# Patient Record
Sex: FEMALE | Race: WHITE | NOT HISPANIC OR LATINO
[De-identification: names, ages, dates, MRNs, and addresses within clinical notes are randomized per-mention and may not be internally consistent; named-entity substitution may affect disease eponyms.]

---

## 2017-10-23 ENCOUNTER — RECORD ABSTRACTING (OUTPATIENT)
Age: 52
End: 2017-10-23

## 2017-10-23 PROBLEM — Z00.00 ENCOUNTER FOR PREVENTIVE HEALTH EXAMINATION: Status: ACTIVE | Noted: 2017-10-23

## 2017-11-05 ENCOUNTER — OUTPATIENT (OUTPATIENT)
Dept: OUTPATIENT SERVICES | Facility: HOSPITAL | Age: 52
LOS: 1 days | End: 2017-11-05
Payer: COMMERCIAL

## 2017-11-05 ENCOUNTER — APPOINTMENT (OUTPATIENT)
Dept: MRI IMAGING | Facility: CLINIC | Age: 52
End: 2017-11-05
Payer: COMMERCIAL

## 2017-11-05 DIAGNOSIS — G93.5 COMPRESSION OF BRAIN: ICD-10-CM

## 2017-11-05 PROCEDURE — 70551 MRI BRAIN STEM W/O DYE: CPT

## 2017-11-05 PROCEDURE — 70551 MRI BRAIN STEM W/O DYE: CPT | Mod: 26

## 2017-11-06 ENCOUNTER — APPOINTMENT (OUTPATIENT)
Dept: NEUROSURGERY | Facility: CLINIC | Age: 52
End: 2017-11-06
Payer: COMMERCIAL

## 2017-11-06 VITALS
WEIGHT: 147 LBS | HEIGHT: 66 IN | DIASTOLIC BLOOD PRESSURE: 60 MMHG | SYSTOLIC BLOOD PRESSURE: 100 MMHG | BODY MASS INDEX: 23.63 KG/M2 | HEART RATE: 100 BPM

## 2017-11-06 DIAGNOSIS — Z87.39 PERSONAL HISTORY OF OTHER DISEASES OF THE MUSCULOSKELETAL SYSTEM AND CONNECTIVE TISSUE: ICD-10-CM

## 2017-11-06 DIAGNOSIS — Z86.79 PERSONAL HISTORY OF OTHER DISEASES OF THE CIRCULATORY SYSTEM: ICD-10-CM

## 2017-11-06 PROCEDURE — 99204 OFFICE O/P NEW MOD 45 MIN: CPT

## 2017-11-06 RX ORDER — TOPIRAMATE 25 MG/1
25 TABLET, FILM COATED ORAL
Qty: 60 | Refills: 0 | Status: DISCONTINUED | COMMUNITY
Start: 2017-08-25

## 2017-11-06 RX ORDER — ACETAZOLAMIDE 250 MG/1
250 TABLET ORAL
Qty: 60 | Refills: 0 | Status: COMPLETED | COMMUNITY
Start: 2017-07-28

## 2017-11-06 RX ORDER — ACETAMINOPHEN 325 MG/1
TABLET, FILM COATED ORAL
Refills: 0 | Status: ACTIVE | COMMUNITY

## 2017-12-18 ENCOUNTER — APPOINTMENT (OUTPATIENT)
Dept: MRI IMAGING | Facility: CLINIC | Age: 52
End: 2017-12-18
Payer: COMMERCIAL

## 2017-12-18 ENCOUNTER — OUTPATIENT (OUTPATIENT)
Dept: OUTPATIENT SERVICES | Facility: HOSPITAL | Age: 52
LOS: 1 days | End: 2017-12-18
Payer: COMMERCIAL

## 2017-12-18 ENCOUNTER — APPOINTMENT (OUTPATIENT)
Dept: NEUROSURGERY | Facility: CLINIC | Age: 52
End: 2017-12-18
Payer: COMMERCIAL

## 2017-12-18 VITALS — WEIGHT: 148 LBS | BODY MASS INDEX: 23.78 KG/M2 | HEIGHT: 66 IN

## 2017-12-18 DIAGNOSIS — G93.2 BENIGN INTRACRANIAL HYPERTENSION: ICD-10-CM

## 2017-12-18 DIAGNOSIS — M26.629 ARTHRALGIA OF TEMPOROMANDIBULAR JOINT,: ICD-10-CM

## 2017-12-18 PROCEDURE — 70549 MR ANGIOGRAPH NECK W/O&W/DYE: CPT | Mod: 26

## 2017-12-18 PROCEDURE — A9585: CPT

## 2017-12-18 PROCEDURE — 70549 MR ANGIOGRAPH NECK W/O&W/DYE: CPT

## 2017-12-18 PROCEDURE — 99214 OFFICE O/P EST MOD 30 MIN: CPT

## 2017-12-21 ENCOUNTER — APPOINTMENT (OUTPATIENT)
Dept: MRI IMAGING | Facility: CLINIC | Age: 52
End: 2017-12-21

## 2018-05-07 ENCOUNTER — OUTPATIENT (OUTPATIENT)
Dept: OUTPATIENT SERVICES | Facility: HOSPITAL | Age: 53
LOS: 1 days | End: 2018-05-07
Payer: COMMERCIAL

## 2018-05-07 ENCOUNTER — APPOINTMENT (OUTPATIENT)
Dept: MRI IMAGING | Facility: CLINIC | Age: 53
End: 2018-05-07
Payer: COMMERCIAL

## 2018-05-07 DIAGNOSIS — G93.2 BENIGN INTRACRANIAL HYPERTENSION: ICD-10-CM

## 2018-05-07 PROCEDURE — 70546 MR ANGIOGRAPH HEAD W/O&W/DYE: CPT | Mod: 26

## 2018-05-07 PROCEDURE — A9585: CPT

## 2018-05-07 PROCEDURE — 70546 MR ANGIOGRAPH HEAD W/O&W/DYE: CPT

## 2018-05-14 ENCOUNTER — APPOINTMENT (OUTPATIENT)
Dept: NEUROSURGERY | Facility: CLINIC | Age: 53
End: 2018-05-14
Payer: COMMERCIAL

## 2018-05-14 VITALS
DIASTOLIC BLOOD PRESSURE: 60 MMHG | BODY MASS INDEX: 24.11 KG/M2 | HEART RATE: 88 BPM | HEIGHT: 66 IN | WEIGHT: 150 LBS | SYSTOLIC BLOOD PRESSURE: 100 MMHG

## 2018-05-14 DIAGNOSIS — G47.33 OBSTRUCTIVE SLEEP APNEA (ADULT) (PEDIATRIC): ICD-10-CM

## 2018-05-14 DIAGNOSIS — Z87.19 PERSONAL HISTORY OF OTHER DISEASES OF THE DIGESTIVE SYSTEM: ICD-10-CM

## 2018-05-14 PROCEDURE — 99214 OFFICE O/P EST MOD 30 MIN: CPT

## 2018-05-16 PROBLEM — G47.33 OBSTRUCTIVE SLEEP APNEA: Status: ACTIVE | Noted: 2018-05-16

## 2018-07-30 ENCOUNTER — CHART COPY (OUTPATIENT)
Age: 53
End: 2018-07-30

## 2018-10-15 ENCOUNTER — APPOINTMENT (OUTPATIENT)
Dept: NEUROSURGERY | Facility: CLINIC | Age: 53
End: 2018-10-15
Payer: COMMERCIAL

## 2018-10-15 ENCOUNTER — APPOINTMENT (OUTPATIENT)
Dept: SPINE | Facility: CLINIC | Age: 53
End: 2018-10-15
Payer: COMMERCIAL

## 2018-10-15 VITALS
DIASTOLIC BLOOD PRESSURE: 60 MMHG | SYSTOLIC BLOOD PRESSURE: 104 MMHG | HEIGHT: 66 IN | BODY MASS INDEX: 22.5 KG/M2 | WEIGHT: 140 LBS

## 2018-10-15 VITALS
HEIGHT: 66 IN | BODY MASS INDEX: 22.5 KG/M2 | WEIGHT: 140 LBS | SYSTOLIC BLOOD PRESSURE: 100 MMHG | DIASTOLIC BLOOD PRESSURE: 60 MMHG | HEART RATE: 100 BPM

## 2018-10-15 PROCEDURE — ZZZZZ: CPT

## 2018-10-15 PROCEDURE — 99204 OFFICE O/P NEW MOD 45 MIN: CPT

## 2018-10-15 PROCEDURE — 99215 OFFICE O/P EST HI 40 MIN: CPT

## 2018-10-30 ENCOUNTER — OUTPATIENT (OUTPATIENT)
Dept: OUTPATIENT SERVICES | Facility: HOSPITAL | Age: 53
LOS: 1 days | End: 2018-10-30
Payer: COMMERCIAL

## 2018-10-30 VITALS
HEIGHT: 66.5 IN | WEIGHT: 139.99 LBS | DIASTOLIC BLOOD PRESSURE: 66 MMHG | TEMPERATURE: 99 F | OXYGEN SATURATION: 98 % | HEART RATE: 85 BPM | RESPIRATION RATE: 18 BRPM | SYSTOLIC BLOOD PRESSURE: 98 MMHG

## 2018-10-30 DIAGNOSIS — Z98.890 OTHER SPECIFIED POSTPROCEDURAL STATES: Chronic | ICD-10-CM

## 2018-10-30 DIAGNOSIS — Z01.818 ENCOUNTER FOR OTHER PREPROCEDURAL EXAMINATION: ICD-10-CM

## 2018-10-30 DIAGNOSIS — G93.5 COMPRESSION OF BRAIN: ICD-10-CM

## 2018-10-30 DIAGNOSIS — L72.3 SEBACEOUS CYST: Chronic | ICD-10-CM

## 2018-10-30 DIAGNOSIS — Z90.89 ACQUIRED ABSENCE OF OTHER ORGANS: Chronic | ICD-10-CM

## 2018-10-30 DIAGNOSIS — Z90.49 ACQUIRED ABSENCE OF OTHER SPECIFIED PARTS OF DIGESTIVE TRACT: Chronic | ICD-10-CM

## 2018-10-30 LAB
ANION GAP SERPL CALC-SCNC: 10 MMOL/L — SIGNIFICANT CHANGE UP (ref 5–17)
BLD GP AB SCN SERPL QL: NEGATIVE — SIGNIFICANT CHANGE UP
BUN SERPL-MCNC: 11 MG/DL — SIGNIFICANT CHANGE UP (ref 7–23)
CALCIUM SERPL-MCNC: 9.6 MG/DL — SIGNIFICANT CHANGE UP (ref 8.4–10.5)
CHLORIDE SERPL-SCNC: 103 MMOL/L — SIGNIFICANT CHANGE UP (ref 96–108)
CO2 SERPL-SCNC: 24 MMOL/L — SIGNIFICANT CHANGE UP (ref 22–31)
CREAT SERPL-MCNC: 0.64 MG/DL — SIGNIFICANT CHANGE UP (ref 0.5–1.3)
GLUCOSE SERPL-MCNC: 80 MG/DL — SIGNIFICANT CHANGE UP (ref 70–99)
HCT VFR BLD CALC: 37.1 % — SIGNIFICANT CHANGE UP (ref 34.5–45)
HGB BLD-MCNC: 12.6 G/DL — SIGNIFICANT CHANGE UP (ref 11.5–15.5)
MCHC RBC-ENTMCNC: 32.6 PG — SIGNIFICANT CHANGE UP (ref 27–34)
MCHC RBC-ENTMCNC: 34 GM/DL — SIGNIFICANT CHANGE UP (ref 32–36)
MCV RBC AUTO: 95.9 FL — SIGNIFICANT CHANGE UP (ref 80–100)
MRSA PCR RESULT.: SIGNIFICANT CHANGE UP
PLATELET # BLD AUTO: 274 K/UL — SIGNIFICANT CHANGE UP (ref 150–400)
POTASSIUM SERPL-MCNC: 3.9 MMOL/L — SIGNIFICANT CHANGE UP (ref 3.5–5.3)
POTASSIUM SERPL-SCNC: 3.9 MMOL/L — SIGNIFICANT CHANGE UP (ref 3.5–5.3)
RBC # BLD: 3.87 M/UL — SIGNIFICANT CHANGE UP (ref 3.8–5.2)
RBC # FLD: 13.2 % — SIGNIFICANT CHANGE UP (ref 10.3–14.5)
RH IG SCN BLD-IMP: POSITIVE — SIGNIFICANT CHANGE UP
S AUREUS DNA NOSE QL NAA+PROBE: SIGNIFICANT CHANGE UP
SODIUM SERPL-SCNC: 137 MMOL/L — SIGNIFICANT CHANGE UP (ref 135–145)
WBC # BLD: 9.65 K/UL — SIGNIFICANT CHANGE UP (ref 3.8–10.5)
WBC # FLD AUTO: 9.65 K/UL — SIGNIFICANT CHANGE UP (ref 3.8–10.5)

## 2018-10-30 PROCEDURE — 87641 MR-STAPH DNA AMP PROBE: CPT

## 2018-10-30 PROCEDURE — 80048 BASIC METABOLIC PNL TOTAL CA: CPT

## 2018-10-30 PROCEDURE — 86850 RBC ANTIBODY SCREEN: CPT

## 2018-10-30 PROCEDURE — 85027 COMPLETE CBC AUTOMATED: CPT

## 2018-10-30 PROCEDURE — 87640 STAPH A DNA AMP PROBE: CPT

## 2018-10-30 PROCEDURE — 86901 BLOOD TYPING SEROLOGIC RH(D): CPT

## 2018-10-30 PROCEDURE — G0463: CPT

## 2018-10-30 PROCEDURE — 86900 BLOOD TYPING SEROLOGIC ABO: CPT

## 2018-10-30 NOTE — H&P PST ADULT - GASTROINTESTINAL
Managing Your Child's Allergies: Care Instructions  Your Care Instructions  Managing your child's allergies is an important part of helping your child stay healthy. Your doctor will help you find out what may be causing the allergies. Common causes of allergy symptoms are house dust and dust mites, animal dander, mold, and pollen. As soon as you know what triggers your child's symptoms, try to reduce your child's exposure to them. This can help prevent allergy symptoms, asthma, and other health problems. Ask your child's doctor about allergy medicine or immunotherapy. These treatments may help reduce or prevent allergy symptoms. Follow-up care is a key part of your child's treatment and safety. Be sure to make and go to all appointments, and call your doctor if your child is having problems. It's also a good idea to know your child's test results and keep a list of the medicines your child takes. How can you care for your child at home? · Learn to tell when your child has severe trouble breathing. Signs may include the chest sinking in, using belly muscles to breathe, or nostrils flaring while struggling to breathe. · If you think that dust or dust mites are causing your child's allergies, decrease the dust immediately around your child's bed:  ¨ Wash sheets, pillowcases and other bedding every week in hot water. ¨ Use airtight, dust-proof covers for pillows, duvets, and mattresses. Avoid plastic covers because they tend to tear quickly and do not \"breathe. \" Wash according to the instructions. ¨ Remove extra blankets and pillows that your child does not need. ¨ Use blankets that are machine-washable. · Use air-conditioning. Change or clean all filters every month. Keep windows closed. · Change the air filter in your furnace every month. Use high-efficiency air filters. · Do not use window or attic fans, which draw dust into the air.   · If your child is allergic to house dust and mites, do not use home humidifiers. They can help mites live longer. Your doctor can give you more instructions on how to control dust and mites. · If your child has allergies to pet dander, keep pets outside or, at the very least, out of your child's bedroom. Old carpet and cloth-covered furniture can hold a lot of animal dander. You may need to replace them. Some children are allergic to cats but not to dogs, and vice versa. · Look for signs of cockroaches. Cockroaches cause allergic reactions in many children. Use cockroach baits to get rid of them. Then clean your home well. Cockroaches like areas where grocery bags, newspapers, empty bottles, or cardboard boxes are stored. Do not keep these items inside your home, and keep trash and food containers sealed. Seal off any spots where cockroaches might enter your home. · If your child is allergic to mold, do not keep indoor plants, because molds can grow in soil. Get rid of furniture, rugs, and drapes that smell musty. Check for mold in the bathroom. · If your child is allergic to pollen, try to keep your child inside when pollen counts are high. · Use a vacuum  with a HEPA filter or a double-thickness filter at least once a week. Keep your child out of the room for several hours after you vacuum. · Avoid other things that can make your child's allergies worse. Keep your child away from smoke. Do not smoke or let anyone else smoke in your house. Do not use fireplaces or wood-burning stoves. Keep your child inside when air pollution is high. Avoid paint fumes, perfumes, and other strong odors. · If your child has asthma, keep an asthma diary. Write down what may have triggered your child's asthma symptoms and what the symptoms are. If you measure your child's peak expiratory flow (PEF), record that as well. Also, record any medicines used. This can help you find a pattern of what triggers your child's symptoms.  Share your child's asthma diary with your child's doctor. · Have your child and other family members get a flu vaccine every year. · Talk to your child's doctor about whether to have your child tested for allergies. When should you call for help? Give an epinephrine shot if:  · You think your child is having a severe allergic reaction. · Your child has symptoms in more than one body area, such as mild nausea and an itchy mouth. After giving an epinephrine shot call 911, even if your child feels better. Call 911 if:  · Your child has symptoms of a severe allergic reaction. These may include:  ¨ Sudden raised, red areas (hives) all over his or her body. ¨ Swelling of the throat, mouth, lips, or tongue. ¨ Trouble breathing. ¨ Passing out (losing consciousness). Or your child may feel very lightheaded or suddenly feel weak, confused, or restless. · Your child has been given an epinephrine shot, even if your child feels better. Call your doctor now or seek immediate medical care if:  · Your child has symptoms of an allergic reaction, such as:  ¨ A rash or hives (raised, red areas on the skin). ¨ Itching. ¨ Swelling. ¨ Belly pain, nausea, or vomiting. Watch closely for changes in your child's health, and be sure to contact your doctor if:  · Your child does not get better as expected. Where can you learn more? Go to http://chanelle-tenisha.info/. Enter T045 in the search box to learn more about \"Managing Your Child's Allergies: Care Instructions. \"  Current as of: February 12, 2016  Content Version: 11.1  © 2083-2877 [employer]. Care instructions adapted under license by [employer] (which disclaims liability or warranty for this information). If you have questions about a medical condition or this instruction, always ask your healthcare professional. [address] any warranty or liability for your use of this information. negative Soft, non-tender, no hepatosplenomegaly, normal bowel sounds

## 2018-10-30 NOTE — H&P PST ADULT - PSH
S/P arthroscopic knee surgery  bilateral, irrigation and debridement for Staph infection of right knee at age 18 S/P appendectomy    S/P arthroscopic knee surgery  bilateral, irrigation and debridement for Staph infection of right knee at age 18  S/P carpal tunnel release    S/P tonsillectomy and adenoidectomy    Sebaceous cyst  S/P excision

## 2018-10-30 NOTE — H&P PST ADULT - HISTORY OF PRESENT ILLNESS
52 y/o F PMH idiopathic intracranial hypertension diagnosed approximately 6 years ago, was treated with Diamox without relief, c/o persistent headaches, blurred vision, and balance issues, was referred to Dr. Tipton, found to have Chiari and hydrocephalus on imaging, EDS and TMJ.  Presents today for marie hole for insertion of intracranial pressure monitor. 52 y/o F PMH asymptomatic MVP, mild MELI, no treatment recommended, EDS type 3, TMJ, idiopathic intracranial hypertension diagnosed approximately 6 years ago, was treated with Diamox without relief, c/o persistent headaches, blurred vision, and balance issues, was referred to Dr. Tipton, found to have Chiari and hydrocephalus on imaging.  Presents today for marie hole for insertion of intracranial pressure monitor.

## 2018-10-30 NOTE — H&P PST ADULT - PMH
Chiari I malformation    EDS (Charlotte-Danlos syndrome)    Hydrocephalus    Intracranial hypertension    MELI (obstructive sleep apnea)  mild, no treatment  TMJ (dislocation of temporomandibular joint)

## 2018-11-05 ENCOUNTER — TRANSCRIPTION ENCOUNTER (OUTPATIENT)
Age: 53
End: 2018-11-05

## 2018-11-06 ENCOUNTER — APPOINTMENT (OUTPATIENT)
Dept: SPINE | Facility: HOSPITAL | Age: 53
End: 2018-11-06

## 2018-11-06 ENCOUNTER — INPATIENT (INPATIENT)
Facility: HOSPITAL | Age: 53
LOS: 0 days | Discharge: HOME | DRG: 26 | End: 2018-11-07
Attending: NEUROLOGICAL SURGERY | Admitting: NEUROLOGICAL SURGERY
Payer: COMMERCIAL

## 2018-11-06 VITALS
OXYGEN SATURATION: 97 % | WEIGHT: 139.99 LBS | DIASTOLIC BLOOD PRESSURE: 65 MMHG | TEMPERATURE: 98 F | HEART RATE: 101 BPM | SYSTOLIC BLOOD PRESSURE: 96 MMHG | HEIGHT: 66 IN | RESPIRATION RATE: 20 BRPM

## 2018-11-06 DIAGNOSIS — G93.5 COMPRESSION OF BRAIN: ICD-10-CM

## 2018-11-06 DIAGNOSIS — L72.3 SEBACEOUS CYST: Chronic | ICD-10-CM

## 2018-11-06 DIAGNOSIS — Z90.89 ACQUIRED ABSENCE OF OTHER ORGANS: Chronic | ICD-10-CM

## 2018-11-06 DIAGNOSIS — Z98.890 OTHER SPECIFIED POSTPROCEDURAL STATES: Chronic | ICD-10-CM

## 2018-11-06 DIAGNOSIS — Z90.49 ACQUIRED ABSENCE OF OTHER SPECIFIED PARTS OF DIGESTIVE TRACT: Chronic | ICD-10-CM

## 2018-11-06 LAB
ALBUMIN SERPL ELPH-MCNC: 3.8 G/DL — SIGNIFICANT CHANGE UP (ref 3.3–5)
ALP SERPL-CCNC: 69 U/L — SIGNIFICANT CHANGE UP (ref 40–120)
ALT FLD-CCNC: 12 U/L — SIGNIFICANT CHANGE UP (ref 10–45)
ANION GAP SERPL CALC-SCNC: 13 MMOL/L — SIGNIFICANT CHANGE UP (ref 5–17)
AST SERPL-CCNC: 15 U/L — SIGNIFICANT CHANGE UP (ref 10–40)
BILIRUB SERPL-MCNC: 0.4 MG/DL — SIGNIFICANT CHANGE UP (ref 0.2–1.2)
BUN SERPL-MCNC: 12 MG/DL — SIGNIFICANT CHANGE UP (ref 7–23)
CALCIUM SERPL-MCNC: 8.7 MG/DL — SIGNIFICANT CHANGE UP (ref 8.4–10.5)
CHLORIDE SERPL-SCNC: 105 MMOL/L — SIGNIFICANT CHANGE UP (ref 96–108)
CO2 SERPL-SCNC: 20 MMOL/L — LOW (ref 22–31)
CREAT SERPL-MCNC: 0.83 MG/DL — SIGNIFICANT CHANGE UP (ref 0.5–1.3)
GLUCOSE SERPL-MCNC: 113 MG/DL — HIGH (ref 70–99)
HCG UR QL: NEGATIVE — SIGNIFICANT CHANGE UP
HCT VFR BLD CALC: 35.5 % — SIGNIFICANT CHANGE UP (ref 34.5–45)
HGB BLD-MCNC: 12.1 G/DL — SIGNIFICANT CHANGE UP (ref 11.5–15.5)
MAGNESIUM SERPL-MCNC: 2 MG/DL — SIGNIFICANT CHANGE UP (ref 1.6–2.6)
MCHC RBC-ENTMCNC: 33.3 PG — SIGNIFICANT CHANGE UP (ref 27–34)
MCHC RBC-ENTMCNC: 34 GM/DL — SIGNIFICANT CHANGE UP (ref 32–36)
MCV RBC AUTO: 98.1 FL — SIGNIFICANT CHANGE UP (ref 80–100)
PHOSPHATE SERPL-MCNC: 4 MG/DL — SIGNIFICANT CHANGE UP (ref 2.5–4.5)
PLATELET # BLD AUTO: 249 K/UL — SIGNIFICANT CHANGE UP (ref 150–400)
POTASSIUM SERPL-MCNC: 4.3 MMOL/L — SIGNIFICANT CHANGE UP (ref 3.5–5.3)
POTASSIUM SERPL-SCNC: 4.3 MMOL/L — SIGNIFICANT CHANGE UP (ref 3.5–5.3)
PROT SERPL-MCNC: 6 G/DL — SIGNIFICANT CHANGE UP (ref 6–8.3)
RBC # BLD: 3.62 M/UL — LOW (ref 3.8–5.2)
RBC # FLD: 12 % — SIGNIFICANT CHANGE UP (ref 10.3–14.5)
RH IG SCN BLD-IMP: POSITIVE — SIGNIFICANT CHANGE UP
SODIUM SERPL-SCNC: 138 MMOL/L — SIGNIFICANT CHANGE UP (ref 135–145)
WBC # BLD: 7.9 K/UL — SIGNIFICANT CHANGE UP (ref 3.8–10.5)
WBC # FLD AUTO: 7.9 K/UL — SIGNIFICANT CHANGE UP (ref 3.8–10.5)

## 2018-11-06 PROCEDURE — 61210 BURR HOLE IMPLT VENTR CATH: CPT

## 2018-11-06 PROCEDURE — 99291 CRITICAL CARE FIRST HOUR: CPT

## 2018-11-06 RX ORDER — LIDOCAINE HCL 20 MG/ML
0.2 VIAL (ML) INJECTION ONCE
Qty: 0 | Refills: 0 | Status: COMPLETED | OUTPATIENT
Start: 2018-11-06 | End: 2018-11-06

## 2018-11-06 RX ORDER — SODIUM CHLORIDE 9 MG/ML
3 INJECTION INTRAMUSCULAR; INTRAVENOUS; SUBCUTANEOUS EVERY 8 HOURS
Qty: 0 | Refills: 0 | Status: DISCONTINUED | OUTPATIENT
Start: 2018-11-06 | End: 2018-11-06

## 2018-11-06 RX ORDER — HYDROMORPHONE HYDROCHLORIDE 2 MG/ML
0.5 INJECTION INTRAMUSCULAR; INTRAVENOUS; SUBCUTANEOUS
Qty: 0 | Refills: 0 | Status: DISCONTINUED | OUTPATIENT
Start: 2018-11-06 | End: 2018-11-06

## 2018-11-06 RX ORDER — PANTOPRAZOLE SODIUM 20 MG/1
40 TABLET, DELAYED RELEASE ORAL DAILY
Qty: 0 | Refills: 0 | Status: DISCONTINUED | OUTPATIENT
Start: 2018-11-06 | End: 2018-11-07

## 2018-11-06 RX ORDER — DEXTROSE MONOHYDRATE, SODIUM CHLORIDE, AND POTASSIUM CHLORIDE 50; .745; 4.5 G/1000ML; G/1000ML; G/1000ML
1000 INJECTION, SOLUTION INTRAVENOUS
Qty: 0 | Refills: 0 | Status: DISCONTINUED | OUTPATIENT
Start: 2018-11-06 | End: 2018-11-06

## 2018-11-06 RX ORDER — ONDANSETRON 8 MG/1
4 TABLET, FILM COATED ORAL ONCE
Qty: 0 | Refills: 0 | Status: DISCONTINUED | OUTPATIENT
Start: 2018-11-06 | End: 2018-11-07

## 2018-11-06 RX ORDER — OXYCODONE AND ACETAMINOPHEN 5; 325 MG/1; MG/1
1 TABLET ORAL EVERY 4 HOURS
Qty: 0 | Refills: 0 | Status: DISCONTINUED | OUTPATIENT
Start: 2018-11-06 | End: 2018-11-07

## 2018-11-06 RX ORDER — ONDANSETRON 8 MG/1
4 TABLET, FILM COATED ORAL EVERY 6 HOURS
Qty: 0 | Refills: 0 | Status: DISCONTINUED | OUTPATIENT
Start: 2018-11-06 | End: 2018-11-07

## 2018-11-06 RX ORDER — OXYCODONE AND ACETAMINOPHEN 5; 325 MG/1; MG/1
2 TABLET ORAL EVERY 6 HOURS
Qty: 0 | Refills: 0 | Status: DISCONTINUED | OUTPATIENT
Start: 2018-11-06 | End: 2018-11-07

## 2018-11-06 RX ORDER — ACETAMINOPHEN 500 MG
650 TABLET ORAL EVERY 6 HOURS
Qty: 0 | Refills: 0 | Status: DISCONTINUED | OUTPATIENT
Start: 2018-11-06 | End: 2018-11-07

## 2018-11-06 RX ORDER — SODIUM CHLORIDE 9 MG/ML
1000 INJECTION INTRAMUSCULAR; INTRAVENOUS; SUBCUTANEOUS
Qty: 0 | Refills: 0 | Status: DISCONTINUED | OUTPATIENT
Start: 2018-11-06 | End: 2018-11-07

## 2018-11-06 RX ORDER — DOCUSATE SODIUM 100 MG
100 CAPSULE ORAL THREE TIMES A DAY
Qty: 0 | Refills: 0 | Status: DISCONTINUED | OUTPATIENT
Start: 2018-11-06 | End: 2018-11-07

## 2018-11-06 RX ORDER — HYDROMORPHONE HYDROCHLORIDE 2 MG/ML
0.5 INJECTION INTRAMUSCULAR; INTRAVENOUS; SUBCUTANEOUS
Qty: 0 | Refills: 0 | Status: DISCONTINUED | OUTPATIENT
Start: 2018-11-06 | End: 2018-11-07

## 2018-11-06 RX ADMIN — DEXTROSE MONOHYDRATE, SODIUM CHLORIDE, AND POTASSIUM CHLORIDE 85 MILLILITER(S): 50; .745; 4.5 INJECTION, SOLUTION INTRAVENOUS at 16:25

## 2018-11-06 RX ADMIN — Medication 100 MILLIGRAM(S): at 21:55

## 2018-11-06 RX ADMIN — SODIUM CHLORIDE 85 MILLILITER(S): 9 INJECTION INTRAMUSCULAR; INTRAVENOUS; SUBCUTANEOUS at 21:24

## 2018-11-06 RX ADMIN — Medication 0.2 MILLILITER(S): at 11:02

## 2018-11-06 RX ADMIN — SODIUM CHLORIDE 3 MILLILITER(S): 9 INJECTION INTRAMUSCULAR; INTRAVENOUS; SUBCUTANEOUS at 11:02

## 2018-11-06 NOTE — PROGRESS NOTE ADULT - SUBJECTIVE AND OBJECTIVE BOX
SUMMARY:  53 year-old woman with asymptomatic MVP, mild MELI on no treatment, EDS type 3, TMJ and idiopathic intracranial hypertension diagnosed approximately 6 years ago treated with Diamox without relief who presents with persistent headaches, blurred vision and balance issues and found to have Chiari I and hydrocephalus on imaging. She underwent marie hole for insertion of intracranial pressure monitor on 11/6/18.    24 HOUR EVENTS:  Admitted to PACU.     VITALS/DATA/ORDERS: [x] Reviewed    EXAMINATION:  General: No acute distress  HEENT: Anicteric sclerae  Cardiac: [id number]  Lungs: Clear  Abdomen: Soft, non-tender, +BS  Extremities: No c/c/e  Skin/Incision Site: Clean, dry and intact  Neurologic: Awake, alert, fully oriented, follows commands, PERRL, VFFtc, EOMI, face symmetric, tongue midline, no drift, full strength

## 2018-11-06 NOTE — PROGRESS NOTE ADULT - ASSESSMENT
ASSESSMENT/PLAN: Chiari 1 malformation versus pseudotumor cerebri    NEURO:  Monitor ICPs  Pain control  Activity: [x] mobilize as tolerated [] Bedrest [] PT [] OT [] PMNR    PULM:  Incentive spirometry    CV:  SBP goal 90-150mmHg    RENAL:  Fluids: IVF until good POs    GI:  Diet: Advance as tolerated    ENDO:   Goal euglycemia (-180)    HEME/ONC:  VTE prophylaxis: [x] SCDs [] chemoprophylaxis [x] hold chemoprophylaxis due to: fresh post-op [] high risk of DVT/PE on admission due to:    ID:  Afebrile    SOCIAL/FAMILY:  [x] awaiting [] updated at bedside [] family meeting    CODE STATUS:  [x] Full Code [] DNR [] DNI [] Palliative/Comfort Care    DISPOSITION:  [x] ICU [] Stroke Unit [] Floor [] EMU [] RCU [] PCU    [x] Patient is at high risk of neurologic deterioration/death due to: brain compression, refractory elevated intracranial pressure, intracerebral hemorrhage     Time spent: 30 [x] critical care minutes    Contact: [phone]

## 2018-11-06 NOTE — BRIEF OPERATIVE NOTE - PROCEDURE
<<-----Click on this checkbox to enter Procedure Intracranial pressure monitoring  11/06/2018    Active  KAM[facility]

## 2018-11-07 ENCOUNTER — TRANSCRIPTION ENCOUNTER (OUTPATIENT)
Age: 53
End: 2018-11-07

## 2018-11-07 VITALS
OXYGEN SATURATION: 99 % | HEART RATE: 62 BPM | RESPIRATION RATE: 15 BRPM | SYSTOLIC BLOOD PRESSURE: 95 MMHG | DIASTOLIC BLOOD PRESSURE: 61 MMHG

## 2018-11-07 PROCEDURE — 70450 CT HEAD/BRAIN W/O DYE: CPT

## 2018-11-07 PROCEDURE — 85027 COMPLETE CBC AUTOMATED: CPT

## 2018-11-07 PROCEDURE — 70450 CT HEAD/BRAIN W/O DYE: CPT | Mod: 26

## 2018-11-07 PROCEDURE — 86901 BLOOD TYPING SEROLOGIC RH(D): CPT

## 2018-11-07 PROCEDURE — 83735 ASSAY OF MAGNESIUM: CPT

## 2018-11-07 PROCEDURE — 97161 PT EVAL LOW COMPLEX 20 MIN: CPT

## 2018-11-07 PROCEDURE — 99233 SBSQ HOSP IP/OBS HIGH 50: CPT

## 2018-11-07 PROCEDURE — 86900 BLOOD TYPING SEROLOGIC ABO: CPT

## 2018-11-07 PROCEDURE — C1889: CPT

## 2018-11-07 PROCEDURE — 84100 ASSAY OF PHOSPHORUS: CPT

## 2018-11-07 PROCEDURE — 80053 COMPREHEN METABOLIC PANEL: CPT

## 2018-11-07 PROCEDURE — 81025 URINE PREGNANCY TEST: CPT

## 2018-11-07 RX ORDER — OXYCODONE AND ACETAMINOPHEN 5; 325 MG/1; MG/1
2 TABLET ORAL EVERY 4 HOURS
Qty: 0 | Refills: 0 | Status: DISCONTINUED | OUTPATIENT
Start: 2018-11-07 | End: 2018-11-07

## 2018-11-07 RX ORDER — SENNA PLUS 8.6 MG/1
2 TABLET ORAL AT BEDTIME
Qty: 0 | Refills: 0 | Status: DISCONTINUED | OUTPATIENT
Start: 2018-11-07 | End: 2018-11-07

## 2018-11-07 RX ORDER — DOCUSATE SODIUM 100 MG
100 CAPSULE ORAL DAILY
Qty: 0 | Refills: 0 | Status: DISCONTINUED | OUTPATIENT
Start: 2018-11-07 | End: 2018-11-07

## 2018-11-07 RX ORDER — DIAZEPAM 5 MG
1 TABLET ORAL
Qty: 0 | Refills: 0 | COMMUNITY

## 2018-11-07 RX ORDER — DOCUSATE SODIUM 100 MG
1 CAPSULE ORAL
Qty: 0 | Refills: 0 | COMMUNITY
Start: 2018-11-07

## 2018-11-07 RX ORDER — SENNA PLUS 8.6 MG/1
2 TABLET ORAL
Qty: 0 | Refills: 0 | COMMUNITY
Start: 2018-11-07

## 2018-11-07 RX ADMIN — OXYCODONE AND ACETAMINOPHEN 1 TABLET(S): 5; 325 TABLET ORAL at 07:52

## 2018-11-07 RX ADMIN — OXYCODONE AND ACETAMINOPHEN 2 TABLET(S): 5; 325 TABLET ORAL at 11:55

## 2018-11-07 RX ADMIN — OXYCODONE AND ACETAMINOPHEN 1 TABLET(S): 5; 325 TABLET ORAL at 08:30

## 2018-11-07 RX ADMIN — SODIUM CHLORIDE 85 MILLILITER(S): 9 INJECTION INTRAMUSCULAR; INTRAVENOUS; SUBCUTANEOUS at 09:54

## 2018-11-07 RX ADMIN — Medication 100 MILLIGRAM(S): at 13:50

## 2018-11-07 RX ADMIN — Medication 100 MILLIGRAM(S): at 06:11

## 2018-11-07 RX ADMIN — OXYCODONE AND ACETAMINOPHEN 2 TABLET(S): 5; 325 TABLET ORAL at 12:30

## 2018-11-07 NOTE — DISCHARGE NOTE ADULT - MEDICATION SUMMARY - MEDICATIONS TO TAKE
I will START or STAY ON the medications listed below when I get home from the hospital:    oxyCODONE-acetaminophen 5 mg-325 mg oral tablet  -- 1-2 tab(s) by mouth every 4-6  hours, As needed, Moderate to severe Pain MDD:6tabs  -- Indication: For pain     Tylenol 325 mg oral capsule  -- 1 cap(s) by mouth 3 times a day, As Needed  -- Indication: For mild pain     docusate sodium 100 mg oral capsule  -- 1 cap(s) by mouth 3 times a day  -- Indication: For Constipation     senna oral tablet  -- 2 tab(s) by mouth once a day (at bedtime)  -- Indication: For Constipation

## 2018-11-07 NOTE — PHYSICAL THERAPY INITIAL EVALUATION ADULT - PERTINENT HX OF CURRENT PROBLEM, REHAB EVAL
54 y/o F PMH asymptomatic MVP, mild MELI, no treatment recommended, EDS type 3, TMJ, idiopathic intracranial hypertension diagnosed approximately 6 years ago, was treated with Diamox without relief, c/o persistent headaches, blurred vision, and balance issues, was referred to Dr. Tipton, found to have Chiari and hydrocephalus on imaging.  Presents today for marie hole for insertion of intracranial pressure monitor.

## 2018-11-07 NOTE — DISCHARGE NOTE ADULT - MEDICATION SUMMARY - MEDICATIONS TO STOP TAKING
I will STOP taking the medications listed below when I get home from the hospital:    Valium 5 mg oral tablet  -- 1 tab(s) by mouth 3 times a day, As Needed

## 2018-11-07 NOTE — DISCHARGE NOTE ADULT - PLAN OF CARE
S/p insertion of intracranial monitor Please follow up Neurosurgeon Dr. Chamberlain in one week 11/14/18. Please call office to make appointment.  Staples must be removed by neurosurgeon on follow up appointment.  May shower on postoperative day #4 (11/11/18) until then please sponge bath, please keep incision clean and dry, do not submerge wound in water for prolonged periods of time, pat dry after showering, and do not use any creams or ointments to incision.

## 2018-11-07 NOTE — DISCHARGE NOTE ADULT - CARE PLAN
Principal Discharge DX:	Intracranial hypertension  Goal:	S/p insertion of intracranial monitor  Assessment and plan of treatment:	Please follow up Neurosurgeon Dr. Chamberlain in one week 11/14/18. Please call office to make appointment.  Staples must be removed by neurosurgeon on follow up appointment.  May shower on postoperative day #4 (11/11/18) until then please sponge bath, please keep incision clean and dry, do not submerge wound in water for prolonged periods of time, pat dry after showering, and do not use any creams or ointments to incision.

## 2018-11-07 NOTE — DISCHARGE NOTE ADULT - ADDITIONAL INSTRUCTIONS
Please follow up Neurosurgeon Dr. Chamberlain in one week 11/14/18. Please call office to make appointment.   Please follow up with Primary Care Physician in one week 11/14/18. Please call office to make appointment.     Staples must be removed by neurosurgeon on follow up appointment.   May shower on postoperative day #4 (11/11/18) until then please sponge bath, please keep incision clean and dry, do not submerge wound in water for prolonged periods of time, pat dry after showering, and do not use any creams or ointments to incision.     Please do not engage in strenuous activity, heavy lifting, drive, or return to work or school until cleared by Neurosurgeon. Do not restart your Aspirin or take any Motrin/ NSAIDS until checking with your Neurosurgeon.     Please return immediately to the ED for any of the following: fevers, bleeding, swelling, pain not relieved by medication, increased sluggishness or irritability, increased nausea or vomiting, inability to tolerate foods or liquids, increased numbness/tingling/ or inability to move extremities, seizures. Please follow up Neurosurgeon Dr. Chamberlain in one week 11/14/18. Please call office to make appointment.   Please follow up with Primary Care Physician in one week 11/14/18. Please call office to make appointment.     Staples must be removed by neurosurgeon on follow up appointment and wound check.   May shower on postoperative day #4 (11/11/18) until then please sponge bath, please keep incision clean and dry, do not submerge wound in water for prolonged periods of time, pat dry after showering, and do not use any creams or ointments to incision.     Please do not engage in strenuous activity, heavy lifting, drive, or return to work or school until cleared by Neurosurgeon. Do not restart your Aspirin or take any Motrin/ NSAIDS until checking with your Neurosurgeon.     Please return immediately to the ED for any of the following: fevers, bleeding, swelling, pain not relieved by medication, increased sluggishness or irritability, increased nausea or vomiting, inability to tolerate foods or liquids, increased numbness/tingling/ or inability to move extremities, seizures.

## 2018-11-07 NOTE — PROGRESS NOTE ADULT - SUBJECTIVE AND OBJECTIVE BOX
NSCU ATTENDING -- ADDITIONAL PROGRESS NOTE    Nighttime rounds were performed -- please refer to earlier Progress Note for HPI details.    T(C): 36.5 (11-06-18 @ 15:55), Max: 36.7 (11-06-18 @ 10:20)  HR: 69 (11-06-18 @ 23:00) (69 - 101)  BP: 90/52 (11-06-18 @ 23:00) (88/53 - 105/65)  RR: 16 (11-06-18 @ 23:00) (14 - 20)  SpO2: 97% (11-06-18 @ 23:00) (96% - 100%)  Wt(kg): --    Relevant labwork and imaging reviewed.    Neurologically intact.  ICPs low single digits.  Follow up plan per NSG in AM.

## 2018-11-07 NOTE — DISCHARGE NOTE ADULT - PATIENT PORTAL LINK FT
You can access the [facility], offered by [facility], by registering with the following website: [email or web address]

## 2018-11-07 NOTE — DISCHARGE NOTE ADULT - NS AS ACTIVITY OBS
Do not make important decisions/Showering allowed/Walking-Indoors allowed/Do not drive or operate machinery/No Heavy lifting/straining

## 2018-11-07 NOTE — PROGRESS NOTE ADULT - SUBJECTIVE AND OBJECTIVE BOX
SUMMARY:  53 year-old woman with asymptomatic MVP, mild MELI on no treatment, EDS type 3, TMJ and idiopathic intracranial hypertension diagnosed approximately 6 years ago treated with Diamox without relief who presents with persistent headaches, blurred vision and balance issues and found to have Chiari I and hydrocephalus on imaging. She underwent marie hole for insertion of intracranial pressure monitor on 11/6/18.    24 HOUR EVENTS:  pain controlled overnight, no ICP issues   VITALS/DATA/ORDERS: [x] Reviewed    EXAMINATION:  General: No acute distress  HEENT: Anicteric sclerae  Cardiac: [id number]  Lungs: Clear  Abdomen: Soft, non-tender, +BS  Extremities: No c/c/e  Skin/Incision Site: Clean, dry and intact  Neurologic: Awake, alert, fully oriented, follows commands, PERRL, VFFtc, EOMI, face symmetric, tongue midline, no drift, full strength SUMMARY:  53 year-old woman with asymptomatic MVP, mild MELI on no treatment, EDS type 3, TMJ and idiopathic intracranial hypertension diagnosed approximately 6 years ago treated with Diamox without relief who presents with persistent headaches, blurred vision and balance issues and found to have Chiari I and hydrocephalus on imaging. She underwent marie hole for insertion of intracranial pressure monitor on 11/6/18.    24 HOUR EVENTS:  pain controlled overnight, no ICP issues     REVIEW OF SYSTEMS:  Afebrile, no chest pain    VITALS/DATA/ORDERS: [x] Reviewed    EXAMINATION:  General: No acute distress  HEENT: Anicteric sclerae  Cardiac: [id number]  Lungs: Clear  Abdomen: Soft, non-tender, +BS  Extremities: No c/c/e  Skin/Incision Site: Clean, dry and intact  Neurologic: Awake, alert, fully oriented, follows commands, PERRL, EOMI, face symmetric, tongue midline, no drift, full strength

## 2018-11-07 NOTE — DISCHARGE NOTE ADULT - HOSPITAL COURSE
52 y/o Female with PMHx of asymptomatic MVP, mild MELI, no treatment recommended, EDS type 3, TMJ, idiopathic intracranial hypertension diagnosed approximately 6 years ago, was treated with Diamox without relief, c/o persistent headaches, blurred vision, and balance issues, was found to have Chiari and hydrocephalus on imaging.  Pt underwent Rt intracranial pressure monitor on 11/6/18. Pressures were monitored resents recorded and ICP monitor was removed on 11/7/18.  CT done post removal which was stable. Pt was seen by physical therapy... Pt is medically and neurologically stable for discharge. 54 y/o Female with PMHx of asymptomatic MVP, mild MELI, no treatment recommended, EDS type 3, TMJ, idiopathic intracranial hypertension diagnosed approximately 6 years ago, was treated with Diamox without relief, c/o persistent headaches, blurred vision, and balance issues, was found to have Chiari and hydrocephalus on imaging.  Pt underwent Rt intracranial pressure monitor on 11/6/18. Pressures were monitored resents recorded and ICP monitor was removed on 11/7/18.  CT done post removal which was stable. Pt was seen by physical therapy and recommended to go home with no PT needs. Pt is stable for discharge.

## 2018-11-07 NOTE — PROGRESS NOTE ADULT - SUBJECTIVE AND OBJECTIVE BOX
Vital Signs Last 24 Hrs  T(C): 36.6 (07 Nov 2018 00:00), Max: 36.7 (06 Nov 2018 10:20)  T(F): 97.9 (07 Nov 2018 00:00), Max: 98.1 (06 Nov 2018 10:20)  HR: 67 (07 Nov 2018 00:00) (67 - 101)  BP: 90/57 (07 Nov 2018 00:00) (88/53 - 105/65)  BP(mean): 67 (07 Nov 2018 00:00) (64 - 74)  RR: 16 (07 Nov 2018 00:00) (14 - 20)  SpO2: 97% (07 Nov 2018 00:00) (96% - 100%)    EXAM  AOx3, FC, PERRL, EOMI, no facial   5/5 throughout, no drift  SILT  no clonus

## 2018-11-07 NOTE — PROGRESS NOTE ADULT - ASSESSMENT
ASSESSMENT/PLAN: Chiari 1 malformation versus pseudotumor cerebri, s/p intracranial monitor placement     NEURO:  d/c bolt then CT head as per Dr. Chamberlain   Pain control  Activity: [x] mobilize as tolerated [] Bedrest [] PT [] OT [] PMNR    PULM:  Incentive spirometry    CV:  SBP goal 90-150mmHg    RENAL:  Fluids: IVF until good POs    GI:  Diet: Advance as tolerated    ENDO:   Goal euglycemia (-180)    HEME/ONC:  VTE prophylaxis: [x] SCDs [] chemoprophylaxis [x] hold chemoprophylaxis due to: fresh post-op [] high risk of DVT/PE on admission due to:    ID:  Afebrile    SOCIAL/FAMILY:  [x] awaiting [] updated at bedside [] family meeting    CODE STATUS:  [x] Full Code [] DNR [] DNI [] Palliative/Comfort Care    DISPOSITION:  [x] ICU [] Stroke Unit [] Floor [] EMU [] RCU [] PCU  If CT scan post pull stable, then she can go home     [x] Patient is at high risk of neurologic deterioration/death due to: brain compression, refractory elevated intracranial pressure, intracerebral hemorrhage     Time spent: 30 [x] critical care minutes    Contact: [phone] ASSESSMENT/PLAN: Chiari 1 malformation versus pseudotumor cerebri, s/p intracranial monitor placement     NEURO:  d/c bolt then CT head as per Dr. Chamberlain   Pain control  Activity: [x] mobilize as tolerated [] Bedrest [] PT [] OT [] PMNR    PULM:  Incentive spirometry    CV:  SBP goal 90-150mmHg    RENAL:  Fluids: IVF until good POs    GI:  Diet: Advance as tolerated    ENDO:   Goal euglycemia (-180)    HEME/ONC:  VTE prophylaxis: [x] SCDs [] chemoprophylaxis [x] hold chemoprophylaxis due to: fresh post-op [] high risk of DVT/PE on admission due to:    ID:  Afebrile    SOCIAL/FAMILY:  [x] awaiting [] updated at bedside [] family meeting    CODE STATUS:  [x] Full Code [] DNR [] DNI [] Palliative/Comfort Care    Contact: [phone]

## 2018-11-07 NOTE — DISCHARGE NOTE ADULT - CARE PROVIDER_API CALL
Marv Chamberlain (DO), Neurological Surgery  [address] [place] [address]  [address]  [place] [address]  Phone: [phone]  Fax: [phone]

## 2018-11-07 NOTE — PHYSICAL THERAPY INITIAL EVALUATION ADULT - ADDITIONAL COMMENTS
lives in private home, ramp to enter then on first floor, hearing good, R handed, glasses for reading, does not use assistive device

## 2018-11-07 NOTE — PROGRESS NOTE ADULT - ASSESSMENT
PLAN  ICPs in low single digits  neurochecks PLAN  ICPs in low single digits; nurse recording q1 icps  neurochecks

## 2018-11-07 NOTE — PROGRESS NOTE ADULT - ATTENDING COMMENTS
I saw and evaluated the patient. The patient is a 53-year-old female who unfortunately has been suffering from headaches beginning at the back of head, which worsen when coughing or sneezing. Her headaches are associated with blurry vision, photophobia, ringing in her ears, neck pain, hand weakness as evidenced by her dropping of objects, left greater than right upper and lower extremity numbness and tingling radiating to all of her fingers and her smaller toes, and balance issues. Her symptoms have caused her to stop working as a .     The patient was diagnosed with pseudotumor cerebri 5-6 years ago. The patient was found via a lumbar puncture to have an opening pressure of 22. The patient was also found via an MRV to have left transverse sinus stenosis. However, the patient was not found via Neuro-Ophthalmology to have papilledema. The patient has tried Diamox, with minimal to no relief.    The patient was also diagnosed with Chiari 1 malformation this past September, with approximately 8mm descent of her cerebellar tonsils below the foramen magnum. The patient was evaluated by neurosurgeon Dr. Aaron Tipton and referred to my office for possible surgical intervention.     The patient underwent insertion of an intracranial pressure monitor on 11/6/18. Her ICPs have remained between 1 and 8. I do not think that the patient has pseudotumor cerebri, nor do I think that the patient will benefit from insertion of a ventriculoperitoneal shunt.  The patient may benefit from a suboccipital craniectomy, C1 laminectomy, and possible duraplasty, which will be further discussed in my office and possibly scheduled for another admission.    Recommend ICP monitor removal, obtaining a post-removal CT of the head without contrast, and discharge home today. Plan discussed with the patient, who expressed understanding, and is in agreement with the plan. Appreciate [facility] support.
ICPs <10. D/c ICP monitor. CT post-pull. D/C Home per Dr. Chamberlain if CT without heme.

## 2018-11-08 ENCOUNTER — OTHER (OUTPATIENT)
Age: 53
End: 2018-11-08

## 2018-11-08 ENCOUNTER — RESULT CHARGE (OUTPATIENT)
Age: 53
End: 2018-11-08

## 2018-11-08 PROBLEM — G93.5 COMPRESSION OF BRAIN: Chronic | Status: ACTIVE | Noted: 2018-10-30

## 2018-11-08 PROBLEM — G93.2 BENIGN INTRACRANIAL HYPERTENSION: Chronic | Status: ACTIVE | Noted: 2018-10-30

## 2018-11-08 PROBLEM — G91.9 HYDROCEPHALUS, UNSPECIFIED: Chronic | Status: ACTIVE | Noted: 2018-10-30

## 2018-11-08 PROBLEM — Q79.6 EHLERS-DANLOS SYNDROMES: Chronic | Status: ACTIVE | Noted: 2018-10-30

## 2018-11-08 PROBLEM — G47.33 OBSTRUCTIVE SLEEP APNEA (ADULT) (PEDIATRIC): Chronic | Status: ACTIVE | Noted: 2018-10-30

## 2018-11-08 PROBLEM — S03.00XA DISLOCATION OF JAW, UNSPECIFIED SIDE, INITIAL ENCOUNTER: Chronic | Status: ACTIVE | Noted: 2018-10-30

## 2018-11-15 ENCOUNTER — APPOINTMENT (OUTPATIENT)
Dept: SPINE | Facility: CLINIC | Age: 53
End: 2018-11-15
Payer: COMMERCIAL

## 2018-11-15 PROCEDURE — 99024 POSTOP FOLLOW-UP VISIT: CPT

## 2018-11-15 RX ORDER — IBUPROFEN 200 MG/1
TABLET, FILM COATED ORAL
Refills: 0 | Status: COMPLETED | COMMUNITY
End: 2018-11-15

## 2018-11-15 RX ORDER — DIAZEPAM 5 MG/1
5 TABLET ORAL
Qty: 45 | Refills: 0 | Status: COMPLETED | COMMUNITY
Start: 2018-05-14 | End: 2018-11-15

## 2019-01-03 ENCOUNTER — OUTPATIENT (OUTPATIENT)
Dept: OUTPATIENT SERVICES | Facility: HOSPITAL | Age: 54
LOS: 1 days | End: 2019-01-03
Payer: COMMERCIAL

## 2019-01-03 VITALS
HEIGHT: 66 IN | RESPIRATION RATE: 18 BRPM | TEMPERATURE: 98 F | SYSTOLIC BLOOD PRESSURE: 101 MMHG | OXYGEN SATURATION: 99 % | DIASTOLIC BLOOD PRESSURE: 63 MMHG | WEIGHT: 141.1 LBS | HEART RATE: 69 BPM

## 2019-01-03 DIAGNOSIS — G93.5 COMPRESSION OF BRAIN: ICD-10-CM

## 2019-01-03 DIAGNOSIS — L72.3 SEBACEOUS CYST: Chronic | ICD-10-CM

## 2019-01-03 DIAGNOSIS — Z90.49 ACQUIRED ABSENCE OF OTHER SPECIFIED PARTS OF DIGESTIVE TRACT: Chronic | ICD-10-CM

## 2019-01-03 DIAGNOSIS — Z90.89 ACQUIRED ABSENCE OF OTHER ORGANS: Chronic | ICD-10-CM

## 2019-01-03 DIAGNOSIS — Z01.818 ENCOUNTER FOR OTHER PREPROCEDURAL EXAMINATION: ICD-10-CM

## 2019-01-03 DIAGNOSIS — Z98.890 OTHER SPECIFIED POSTPROCEDURAL STATES: Chronic | ICD-10-CM

## 2019-01-03 DIAGNOSIS — Z29.9 ENCOUNTER FOR PROPHYLACTIC MEASURES, UNSPECIFIED: ICD-10-CM

## 2019-01-03 DIAGNOSIS — G47.33 OBSTRUCTIVE SLEEP APNEA (ADULT) (PEDIATRIC): ICD-10-CM

## 2019-01-03 LAB
ANION GAP SERPL CALC-SCNC: 8 MMOL/L — SIGNIFICANT CHANGE UP (ref 5–17)
BLD GP AB SCN SERPL QL: NEGATIVE — SIGNIFICANT CHANGE UP
BUN SERPL-MCNC: 10 MG/DL — SIGNIFICANT CHANGE UP (ref 7–23)
CALCIUM SERPL-MCNC: 10.1 MG/DL — SIGNIFICANT CHANGE UP (ref 8.4–10.5)
CHLORIDE SERPL-SCNC: 101 MMOL/L — SIGNIFICANT CHANGE UP (ref 96–108)
CO2 SERPL-SCNC: 27 MMOL/L — SIGNIFICANT CHANGE UP (ref 22–31)
CREAT SERPL-MCNC: 0.67 MG/DL — SIGNIFICANT CHANGE UP (ref 0.5–1.3)
GLUCOSE SERPL-MCNC: 80 MG/DL — SIGNIFICANT CHANGE UP (ref 70–99)
HCT VFR BLD CALC: 42.4 % — SIGNIFICANT CHANGE UP (ref 34.5–45)
HGB BLD-MCNC: 14.2 G/DL — SIGNIFICANT CHANGE UP (ref 11.5–15.5)
MCHC RBC-ENTMCNC: 32.8 PG — SIGNIFICANT CHANGE UP (ref 27–34)
MCHC RBC-ENTMCNC: 33.5 GM/DL — SIGNIFICANT CHANGE UP (ref 32–36)
MCV RBC AUTO: 97.9 FL — SIGNIFICANT CHANGE UP (ref 80–100)
MRSA PCR RESULT.: SIGNIFICANT CHANGE UP
PLATELET # BLD AUTO: 273 K/UL — SIGNIFICANT CHANGE UP (ref 150–400)
POTASSIUM SERPL-MCNC: 4.5 MMOL/L — SIGNIFICANT CHANGE UP (ref 3.5–5.3)
POTASSIUM SERPL-SCNC: 4.5 MMOL/L — SIGNIFICANT CHANGE UP (ref 3.5–5.3)
RBC # BLD: 4.33 M/UL — SIGNIFICANT CHANGE UP (ref 3.8–5.2)
RBC # FLD: 12.7 % — SIGNIFICANT CHANGE UP (ref 10.3–14.5)
RH IG SCN BLD-IMP: POSITIVE — SIGNIFICANT CHANGE UP
S AUREUS DNA NOSE QL NAA+PROBE: DETECTED
SODIUM SERPL-SCNC: 136 MMOL/L — SIGNIFICANT CHANGE UP (ref 135–145)
WBC # BLD: 6.33 K/UL — SIGNIFICANT CHANGE UP (ref 3.8–10.5)
WBC # FLD AUTO: 6.33 K/UL — SIGNIFICANT CHANGE UP (ref 3.8–10.5)

## 2019-01-03 PROCEDURE — 86901 BLOOD TYPING SEROLOGIC RH(D): CPT

## 2019-01-03 PROCEDURE — 80048 BASIC METABOLIC PNL TOTAL CA: CPT

## 2019-01-03 PROCEDURE — 86900 BLOOD TYPING SEROLOGIC ABO: CPT

## 2019-01-03 PROCEDURE — 86850 RBC ANTIBODY SCREEN: CPT

## 2019-01-03 PROCEDURE — 85027 COMPLETE CBC AUTOMATED: CPT

## 2019-01-03 PROCEDURE — 87640 STAPH A DNA AMP PROBE: CPT

## 2019-01-03 PROCEDURE — G0463: CPT

## 2019-01-03 RX ORDER — ACETAMINOPHEN 500 MG
1 TABLET ORAL
Qty: 0 | Refills: 0 | COMMUNITY

## 2019-01-03 NOTE — H&P PST ADULT - ASSESSMENT
CAPRINI SCORE [CLOT]    AGE RELATED RISK FACTORS                                                       MOBILITY RELATED FACTORS  [ x] Age 41-60 years                                            (1 Point)                  [ ] Bed rest                                                        (1 Point)  [ ] Age: 61-74 years                                           (2 Points)                 [ ] Plaster cast                                                   (2 Points)  [ ] Age= 75 years                                              (3 Points)                 [ ] Bed bound for more than 72 hours                 (2 Points)    DISEASE RELATED RISK FACTORS                                               GENDER SPECIFIC FACTORS  [ ] Edema in the lower extremities                       (1 Point)                  [ ] Pregnancy                                                     (1 Point)  [ ] Varicose veins                                               (1 Point)                  [ ] Post-partum < 6 weeks                                   (1 Point)             [ ] BMI > 25 Kg/m2                                            (1 Point)                  [ ] Hormonal therapy  or oral contraception          (1 Point)                 [ ] Sepsis (in the previous month)                        (1 Point)                  [ ] History of pregnancy complications                 (1 point)  [ ] Pneumonia or serious lung disease                                               [ ] Unexplained or recurrent                     (1 Point)           (in the previous month)                               (1 Point)  [ ] Abnormal pulmonary function test                     (1 Point)                 SURGERY RELATED RISK FACTORS  [ ] Acute myocardial infarction                              (1 Point)                 [ ]  Section                                             (1 Point)  [ ] Congestive heart failure (in the previous month)  (1 Point)               [ ] Minor surgery                                                  (1 Point)   [ ] Inflammatory bowel disease                             (1 Point)                 [ ] Arthroscopic surgery                                        (2 Points)  [ ] Central venous access                                      (2 Points)                [x ] General surgery lasting more than 45 minutes   (2 Points)       [ ] Stroke (in the previous month)                          (5 Points)               [ ] Elective arthroplasty                                         (5 Points)                                                                                                                                               HEMATOLOGY RELATED FACTORS                                                 TRAUMA RELATED RISK FACTORS  [ ] Prior episodes of VTE                                     (3 Points)                 [ ] Fracture of the hip, pelvis, or leg                       (5 Points)  [ ] Positive family history for VTE                         (3 Points)                 [ ] Acute spinal cord injury (in the previous month)  (5 Points)  [ ] Prothrombin 53149 A                                     (3 Points)                 [ ] Paralysis  (less than 1 month)                             (5 Points)  [ ] Factor V Leiden                                             (3 Points)                  [ ] Multiple Trauma within 1 month                        (5 Points)  [ ] Lupus anticoagulants                                     (3 Points)                                                           [ ] Anticardiolipin antibodies                               (3 Points)                                                       [ ] High homocysteine in the blood                      (3 Points)                                             [ ] Other congenital or acquired thrombophilia      (3 Points)                                                [ ] Heparin induced thrombocytopenia                  (3 Points)                                          Total Score [  3        ]

## 2019-01-03 NOTE — H&P PST ADULT - PMH
Chiari I malformation    EDS (Hcarlotte-Danlos syndrome)    Hydrocephalus    Intracranial hypertension    MELI (obstructive sleep apnea)  mild, no treatment  TMJ (dislocation of temporomandibular joint)

## 2019-01-03 NOTE — H&P PST ADULT - PSH
S/P appendectomy  2015  S/P arthroscopic knee surgery  bilateral, irrigation and debridement for Staph infection of right knee at age 18 ( last -1983)  S/P carpal tunnel release  2001, 2002  S/P tonsillectomy and adenoidectomy    Sebaceous cyst  S/P excision History of marie hole surgery  11/6/2018  S/P appendectomy  2015  S/P arthroscopic knee surgery  bilateral, irrigation and debridement for Staph infection of right knee at age 18 ( last -1983)  S/P carpal tunnel release  2001, 2002  S/P tonsillectomy and adenoidectomy    Sebaceous cyst  S/P excision History of marie hole surgery  11/6/2018  S/P appendectomy  2015  S/P arthroscopic knee surgery  bilateral, irrigation and debridement for Staph infection of right knee at age 18 ( last -1983) total 7 knee surgery  S/P carpal tunnel release  2001, 2002  S/P tonsillectomy and adenoidectomy    Sebaceous cyst  S/P excision

## 2019-01-03 NOTE — H&P PST ADULT - PROBLEM SELECTOR PLAN 1
Oriented - self; Oriented - place; Oriented - time C1 Sub Occipital Cranio- cervical Laminectomy  ,Possible Duraplasty on 1/15/2018.    Pre- Op instructions discussed  CBc,BMP ,Type and screen ,MRSA swab sent  [facility] ordered the DOS

## 2019-01-03 NOTE — H&P PST ADULT - HISTORY OF PRESENT ILLNESS
54 y/o F PMH asymptomatic MVP, mild MELI, no treatment recommended, EDS type 3, TMJ, idiopathic intracranial hypertension diagnosed approximately 6 years ago, was treated with Diamox without relief, c/o persistent headaches, blurred vision, and balance issues, was referred to Dr. Tipton, found to have Chiari and hydrocephalus on imaging s/p marie hole for insertion of intracranial pressure monitor in 10/2018. Presents to PST for scheduled  C1 Sub Occipital Cranio- cervical Laminectomy  ,Possible Duraplasty on 1/15/2018. 54 y/o F PMH asymptomatic MVP, mild MELI, no treatment recommended, EDS type 3, TMJ, idiopathic intracranial hypertension diagnosed approximately 6 years ago, was treated with Diamox without relief, c/o persistent headaches, blurred vision, and balance issues, was referred to Dr. Tipton, found to have Chiari and hydrocephalus on imaging followed by neurosurgery s/p marie hole for insertion of intracranial pressure monitor in 11/6/2018. Presents to PST for scheduled  C1 Sub Occipital Cranio- cervical Laminectomy  , Possible Duraplasty on 1/15/2018.

## 2019-01-04 ENCOUNTER — OTHER (OUTPATIENT)
Age: 54
End: 2019-01-04

## 2019-01-04 ENCOUNTER — RESULT CHARGE (OUTPATIENT)
Age: 54
End: 2019-01-04

## 2019-01-14 ENCOUNTER — TRANSCRIPTION ENCOUNTER (OUTPATIENT)
Age: 54
End: 2019-01-14

## 2019-01-15 ENCOUNTER — INPATIENT (INPATIENT)
Facility: HOSPITAL | Age: 54
LOS: 1 days | Discharge: HOME | DRG: 26 | End: 2019-01-17
Attending: NEUROLOGICAL SURGERY | Admitting: NEUROLOGICAL SURGERY
Payer: COMMERCIAL

## 2019-01-15 ENCOUNTER — APPOINTMENT (OUTPATIENT)
Dept: SPINE | Facility: HOSPITAL | Age: 54
End: 2019-01-15

## 2019-01-15 VITALS
RESPIRATION RATE: 20 BRPM | HEART RATE: 80 BPM | DIASTOLIC BLOOD PRESSURE: 68 MMHG | SYSTOLIC BLOOD PRESSURE: 100 MMHG | TEMPERATURE: 98 F | HEIGHT: 66 IN | WEIGHT: 141.1 LBS | OXYGEN SATURATION: 99 %

## 2019-01-15 DIAGNOSIS — L72.3 SEBACEOUS CYST: Chronic | ICD-10-CM

## 2019-01-15 DIAGNOSIS — Z90.89 ACQUIRED ABSENCE OF OTHER ORGANS: Chronic | ICD-10-CM

## 2019-01-15 DIAGNOSIS — Z90.49 ACQUIRED ABSENCE OF OTHER SPECIFIED PARTS OF DIGESTIVE TRACT: Chronic | ICD-10-CM

## 2019-01-15 DIAGNOSIS — Z98.890 OTHER SPECIFIED POSTPROCEDURAL STATES: Chronic | ICD-10-CM

## 2019-01-15 DIAGNOSIS — G93.5 COMPRESSION OF BRAIN: ICD-10-CM

## 2019-01-15 LAB — HCG UR QL: NEGATIVE — SIGNIFICANT CHANGE UP

## 2019-01-15 PROCEDURE — 61343 CRNEC SOPL CRV LAM DCMPRN: CPT

## 2019-01-15 RX ORDER — ONDANSETRON 8 MG/1
4 TABLET, FILM COATED ORAL ONCE
Qty: 0 | Refills: 0 | Status: DISCONTINUED | OUTPATIENT
Start: 2019-01-15 | End: 2019-01-15

## 2019-01-15 RX ORDER — DEXAMETHASONE 0.5 MG/5ML
4 ELIXIR ORAL EVERY 6 HOURS
Qty: 0 | Refills: 0 | Status: DISCONTINUED | OUTPATIENT
Start: 2019-01-15 | End: 2019-01-17

## 2019-01-15 RX ORDER — LIDOCAINE HCL 20 MG/ML
0.2 VIAL (ML) INJECTION ONCE
Qty: 0 | Refills: 0 | Status: DISCONTINUED | OUTPATIENT
Start: 2019-01-15 | End: 2019-01-15

## 2019-01-15 RX ORDER — DEXTROSE MONOHYDRATE, SODIUM CHLORIDE, AND POTASSIUM CHLORIDE 50; .745; 4.5 G/1000ML; G/1000ML; G/1000ML
1000 INJECTION, SOLUTION INTRAVENOUS
Qty: 0 | Refills: 0 | Status: DISCONTINUED | OUTPATIENT
Start: 2019-01-15 | End: 2019-01-16

## 2019-01-15 RX ORDER — SENNA PLUS 8.6 MG/1
2 TABLET ORAL AT BEDTIME
Qty: 0 | Refills: 0 | Status: DISCONTINUED | OUTPATIENT
Start: 2019-01-15 | End: 2019-01-17

## 2019-01-15 RX ORDER — DOCUSATE SODIUM 100 MG
100 CAPSULE ORAL THREE TIMES A DAY
Qty: 0 | Refills: 0 | Status: DISCONTINUED | OUTPATIENT
Start: 2019-01-15 | End: 2019-01-17

## 2019-01-15 RX ORDER — ONDANSETRON 8 MG/1
4 TABLET, FILM COATED ORAL EVERY 6 HOURS
Qty: 0 | Refills: 0 | Status: DISCONTINUED | OUTPATIENT
Start: 2019-01-15 | End: 2019-01-17

## 2019-01-15 RX ORDER — SODIUM CHLORIDE 9 MG/ML
3 INJECTION INTRAMUSCULAR; INTRAVENOUS; SUBCUTANEOUS EVERY 8 HOURS
Qty: 0 | Refills: 0 | Status: DISCONTINUED | OUTPATIENT
Start: 2019-01-15 | End: 2019-01-15

## 2019-01-15 RX ORDER — DIAZEPAM 5 MG
5 TABLET ORAL EVERY 8 HOURS
Qty: 0 | Refills: 0 | Status: DISCONTINUED | OUTPATIENT
Start: 2019-01-15 | End: 2019-01-17

## 2019-01-15 RX ORDER — NALOXONE HYDROCHLORIDE 4 MG/.1ML
0.1 SPRAY NASAL
Qty: 0 | Refills: 0 | Status: DISCONTINUED | OUTPATIENT
Start: 2019-01-15 | End: 2019-01-16

## 2019-01-15 RX ORDER — HYDROMORPHONE HYDROCHLORIDE 2 MG/ML
0.5 INJECTION INTRAMUSCULAR; INTRAVENOUS; SUBCUTANEOUS
Qty: 0 | Refills: 0 | Status: DISCONTINUED | OUTPATIENT
Start: 2019-01-15 | End: 2019-01-15

## 2019-01-15 RX ORDER — DIPHENHYDRAMINE HCL 50 MG
25 CAPSULE ORAL EVERY 4 HOURS
Qty: 0 | Refills: 0 | Status: DISCONTINUED | OUTPATIENT
Start: 2019-01-15 | End: 2019-01-17

## 2019-01-15 RX ORDER — ONDANSETRON 8 MG/1
4 TABLET, FILM COATED ORAL EVERY 6 HOURS
Qty: 0 | Refills: 0 | Status: DISCONTINUED | OUTPATIENT
Start: 2019-01-15 | End: 2019-01-15

## 2019-01-15 RX ORDER — HYDROMORPHONE HYDROCHLORIDE 2 MG/ML
30 INJECTION INTRAMUSCULAR; INTRAVENOUS; SUBCUTANEOUS
Qty: 0 | Refills: 0 | Status: DISCONTINUED | OUTPATIENT
Start: 2019-01-15 | End: 2019-01-16

## 2019-01-15 RX ORDER — HYDROMORPHONE HYDROCHLORIDE 2 MG/ML
0.5 INJECTION INTRAMUSCULAR; INTRAVENOUS; SUBCUTANEOUS
Qty: 0 | Refills: 0 | Status: DISCONTINUED | OUTPATIENT
Start: 2019-01-15 | End: 2019-01-16

## 2019-01-15 RX ADMIN — Medication 5 MILLIGRAM(S): at 17:59

## 2019-01-15 RX ADMIN — SODIUM CHLORIDE 3 MILLILITER(S): 9 INJECTION INTRAMUSCULAR; INTRAVENOUS; SUBCUTANEOUS at 07:54

## 2019-01-15 RX ADMIN — HYDROMORPHONE HYDROCHLORIDE 30 MILLILITER(S): 2 INJECTION INTRAMUSCULAR; INTRAVENOUS; SUBCUTANEOUS at 22:08

## 2019-01-15 RX ADMIN — HYDROMORPHONE HYDROCHLORIDE 30 MILLILITER(S): 2 INJECTION INTRAMUSCULAR; INTRAVENOUS; SUBCUTANEOUS at 21:15

## 2019-01-15 RX ADMIN — Medication 100 MILLIGRAM(S): at 18:58

## 2019-01-15 RX ADMIN — HYDROMORPHONE HYDROCHLORIDE 30 MILLILITER(S): 2 INJECTION INTRAMUSCULAR; INTRAVENOUS; SUBCUTANEOUS at 12:15

## 2019-01-15 RX ADMIN — DEXTROSE MONOHYDRATE, SODIUM CHLORIDE, AND POTASSIUM CHLORIDE 85 MILLILITER(S): 50; .745; 4.5 INJECTION, SOLUTION INTRAVENOUS at 12:18

## 2019-01-15 NOTE — PHYSICAL THERAPY INITIAL EVALUATION ADULT - CRITERIA FOR SKILLED THERAPEUTIC INTERVENTIONS
functional limitations in following categories/home no skilled PT needs/impairments found/anticipated discharge recommendation

## 2019-01-15 NOTE — PROGRESS NOTE ADULT - ASSESSMENT
POD#0 from posterior fossa decompression  - Patient may go to floor when post op more than 4 hours, stable, and pain well controlled

## 2019-01-15 NOTE — PROGRESS NOTE ADULT - SUBJECTIVE AND OBJECTIVE BOX
Patient seen and examined at bedside.    --Anticoagulation--    T(C): 36.1 (01-15-19 @ 11:30), Max: 36.7 (01-15-19 @ 07:46)  HR: 68 (01-15-19 @ 16:00) (59 - 82)  BP: 101/71 (01-15-19 @ 16:00) (89/62 - 115/73)  RR: 16 (01-15-19 @ 16:00) (16 - 20)  SpO2: 98% (01-15-19 @ 16:00) (96% - 100%)  Wt(kg): --    Exam:  AOx3, FC, PERRL, EOMI, no facial   5/5 throughout, no drift  SILT  no clonus

## 2019-01-15 NOTE — PHYSICAL THERAPY INITIAL EVALUATION ADULT - PERTINENT HX OF CURRENT PROBLEM, REHAB EVAL
Pt is a 54 y/o F PMH asymptomatic MVP, mild MELI, no treatment recommended, EDS type 3, TMJ, idiopathic intracranial hypertension diagnosed approximately 6 years ago, was treated with Diamox without relief, c/o persistent headaches, blurred vision, and balance issues, was referred to Dr. Tipton, found to have Chiari and hydrocephalus on imaging followed by neurosurgery s/p marie hole for insertion of intracranial pressure monitor in 11/6/2018. S/p posterior fossa decompression on 1/15

## 2019-01-15 NOTE — BRIEF OPERATIVE NOTE - PROCEDURE
<<-----Click on this checkbox to enter Procedure Suboccipital craniectomy  01/15/2019    Active  RRAUPP

## 2019-01-15 NOTE — PRE-OP CHECKLIST - BSA (M2)
Reason For Visit  Brianna Chen is an established patient here today for a chief complaint of . transitioning care,. :  services not used. A chaperone is not applicable. She is unaccompanied. History of Present Illness  Here to establish care  HTN - complying with meds, still above goal. Willing to add med. Migraines -- reports morning headache, and dull ache all day. Endorses daytime somnolence. Not sure if she snores. Slightly better with Amitriptyline. Obesity - lost weight because she was nauseous a lot, but now eating normally again. Does not exercise. Lung nodule - CT 6/28/18 stable, needs to repeat in 1 year.  is a smoker. Osteoporosis - took Alendronate for 1 month, but wan not sure if she was supposed to continue. Review of Systems    Const: no fever and no chills. Eyes: no blurred vision and no change in vision. CV: no chest pain and no palpitations. Resp: no cough and not expressed as feeling short of breath. GI: no abdominal pain, no diarrhea and no constipation. : no dysuria. Musc: back pain. Neuro:. Per HPI. Skin: no rash. Allergies  No Known Drug Allergies    Current Meds   1. Alendronate Sodium 70 MG Oral Tablet; TAKE 1 TABLET BY MOUTH ONCE A WEEK   WITH 1 GLASS OF WATER BEFORE BREAKFAST; Therapy: 61FTS0072 to (Art Romero)  Requested for: 45HZG0741; Last   Rx:[id number]; Status: ACTIVE - Renewal Denied Ordered   2. Amitriptyline HCl - 10 MG Oral Tablet; TAKE ONE TABLET BY MOUTH NIGHTLY AT   BEDTIME; Therapy: 30BIB8228 to (Yamilex Patel)  Requested for: 49TWF1048; Last   Rx:08Jun2018 Ordered   3. Blood Glucose Monitor System w/Device Kit; dispense one Glucometer; Therapy: 83QNF1020 to (Last Rx:19Oct2017)  Requested for: 19Oct2017 Ordered   4. Blood Glucose Test In Vitro Strip; USE 1 STRIP DAILY; Therapy: 24IVQ6724 to (Evaluate:14Oct2018)  Requested for: 19Oct2017; Last   Rx:19Oct2017 Ordered   5. Famotidine 40 MG Oral Tablet; TAKE 1 TABLET BY MOUTH EVERY DAY; Therapy: 45LZJ7683 to (24-20-52-61)  Requested for: 50LFP3639; Last   Rx:08Jun2018 Ordered   6. HydroCHLOROthiazide 12.5 MG Oral Tablet; TAKE 1 TABLET DAILY BY MOUTH IN THE   MORNING; Therapy: 61PWU5793 to ([facility])  Requested for: 15SKR7016; Last   Rx:08Jun2018 Ordered   7. Icy Hot 5 % External Patch; APPLY 1 PATCH Daily left knee; Therapy: 01Awi8065 to Recorded   8. Lancets; CHECK ONCE DAILY; Therapy: 73GJY0224 to (Evaluate:14Oct2018)  Requested for: 36KJZ5336; Last   Rx:19Oct2017 Ordered   9. Meclizine HCl - 25 MG Oral Tablet; TAKE 1 TABLET EVERY 8 HOURS PRN dizziness; Therapy: 54JOV1548 to (24-20-52-61)  Requested for: 76Ikr5879; Last   Rx:12Jun2018 Ordered   10. Vitamin D 2000 UNIT Oral Capsule; TAKE 1 CAPSULE DAILY; Therapy: 14OLL4083 to (Evaluate:03Jun2019)  Requested for: 06FAQ9329;  Last    Rx:08Jun2018 Ordered    Active Problems  ACP (advance care planning) (Z71.89)   Â· Full code, GOC is stabalization to prolong life, discharge to home      : Stefany Luna [phone]  Back pain (M54.9)  Benign essential hypertension (I10)  Blurry vision (H53.8)  Colon cancer screening (Z12.11)  Dizziness (R42)  Heartburn (R12)  Hemorrhoids (K64.9)  History of colonoscopy (D85.248)   Â· 11/2014  Lung nodule (R91.1)   Â· CT 6/2018 - repeat 12 months for 2 yr stability  Migraine headache (G43.909)  Morbid obesity (E66.01)  Osteoporosis (M81.0)  Postmenopause (Z78.0)  Prediabetes (R73.03)  Pre-op examination (Z01.818)  Primary osteoarthritis of both knees (M17.0)  S/P total knee arthroplasty (Z20.112)  Visit for screening mammogram (Z12.31)    Past Medical History  History of Dental infection (K04.7)  History of Easy bruising (R23.8)  History of Encounter for screening for diabetes mellitus (Z13.1)  History of burning on urination  History of conjunctivitis (Z86.69)  History of urinary tract infection (Z87.440)  History of vitamin D deficiency (Z86.39)  History of Need for immunization against influenza (Z23)  History of Need for pneumococcal vaccination (Z23)  History of Screening for breast cancer (Z12.31)  History of Screening for thyroid disorder (Z13.29)    Surgical History  History of Cholecystectomy Laparoscopic    Family History  Son   Family history of alcoholism (Z81.1)  Family history of type 2 diabetes mellitus (Z83.3)  Sister   Family history of malignant neoplasm (Z80.9)    Social History  Denied: History of Alcohol use (Z78.9)    Never smoker  No drug use  Retired    Review  Past medical history, problem list, family medical history, surgical history and social history reviewed. Vitals  Signs   Recorded: 16Msd9761 10:03AM   Weight: 226 lb 3.04 oz  BMI Calculated: 42.74  BSA Calculated: 7.84  Systolic: 205, LUE, Sitting  Diastolic: 72, LUE, Sitting  Temperature: 97.5 F, Tympanic  Heart Rate: 85  Respiration: 20  O2 Saturation: 98    Physical Exam  Constitutional: alert, in no acute distress and current vital signs reviewed. Head and Face: atraumatic, no deformities, normocephalic, normal facies. Eyes: the sclerae were normal. pupils equal, round and reactive to light and accommodation. ENT: oral mucosa pink and moist, no oral lesions, normal appearing pharynx, normal appearing tongue. Neck: supple neck. thyroid not enlarged. Pulmonary: no respiratory distress, normal respiratory rate and effort and no accessory muscle use. breath sounds clear to auscultation bilaterally. Cardiovascular: normal rate, no murmurs were heard, regular rhythm, normal S1 and normal S2. edema was not present in the lower extremities. Abdomen: soft and nontender. Musculoskeletal: normal gait. Psychiatric: alert and awake, interactive and mood/affect were appropriate. Skin, Hair, Nails: normal skin color and pigmentation.       Immunizations  Influenza --- Shellie Curling: 12-Oct-2016  (67y)   PPSV --- Series1: 12-Oct-2016  (67y)   Immunization Status: Immunizations are needed. Assessment  Benign essential hypertension (I10)  Need for vaccination (Z23)  Daytime somnolence (R40.0)  Morning headache (R51)  Morbid obesity (E66.01)  Lung nodule (R91.1)   Â· CT 6/2018 - repeat 12 months for 2 yr stability  Osteoporosis (M81.0)  Prediabetes (R73.03)    Plan  HydroCHLOROthiazide 12.5 MG Oral Tablet  Lisinopril-Hydrochlorothiazide 10-12.5 MG Oral Tablet; TAKE 1 TABLET BY MOUTH  EVERY DAY  Alendronate Sodium 70 MG Oral Tablet; TOME MARCIA TABLETA CADA SEMANA  WITH GLASS OF WATER BEFORE BREAKFAST  Amitriptyline HCl - 10 MG Oral Tablet; TAKE ONE TABLET BY MOUTH NIGHTLY AT  BEDTIME  Famotidine 40 MG Oral Tablet; TAKE 1 TABLET BY MOUTH EVERY DAY  Prevnar 13 Intramuscular Suspension  Sleep Study Evaluation Only  sleep study with cpap titration  Status: Active  Requested for:  [id number]  Care Summary provided. : Yes  Plans:     Plan:   HTN above goal -- add ACEi  Osteoporosis -- continue Fosamax, repeat DEXA in 3-5 years  Meds reviewed, renewed as needed  Headaches, daytime somnolence, obesity -- needs sleep study. Imm: Prevnar today  Prediabetes -- encouraged daily exercise, portion control  Lung nodule - repeat June 2019    RTC 2 months, sooner as needed. Patient expresses understanding of the plan. Signatures   Electronically signed by :  Tiffany Orr, ; Aug 22 2018 10:06AM CST (Co-author)    Electronically signed by : Juni Carvajal MD; Aug 22 2018 10:30AM CST (Author) 1.72

## 2019-01-15 NOTE — PHYSICAL THERAPY INITIAL EVALUATION ADULT - DID THE PATIENT HAVE SURGERY?
Pt is a 52 y/o F PMH asymptomatic MVP, mild MELI, no treatment recommended, EDS type 3, TMJ, idiopathic intracranial hypertension diagnosed approximately 6 years ago, was treated with Diamox without relief, c/o persistent headaches, blurred vision, and balance issues, was referred to Dr. Tipton, found to have Chiari and hydrocephalus on imaging followed by neurosurgery s/p marie hole for insertion of intracranial pressure monitor in 11/6/2018. S/p posterior fossa decompression on 1/15/yes

## 2019-01-16 LAB
ANION GAP SERPL CALC-SCNC: 9 MMOL/L — SIGNIFICANT CHANGE UP (ref 5–17)
BUN SERPL-MCNC: 9 MG/DL — SIGNIFICANT CHANGE UP (ref 7–23)
CALCIUM SERPL-MCNC: 9.2 MG/DL — SIGNIFICANT CHANGE UP (ref 8.4–10.5)
CHLORIDE SERPL-SCNC: 106 MMOL/L — SIGNIFICANT CHANGE UP (ref 96–108)
CO2 SERPL-SCNC: 23 MMOL/L — SIGNIFICANT CHANGE UP (ref 22–31)
CREAT SERPL-MCNC: 0.57 MG/DL — SIGNIFICANT CHANGE UP (ref 0.5–1.3)
GLUCOSE SERPL-MCNC: 97 MG/DL — SIGNIFICANT CHANGE UP (ref 70–99)
HCT VFR BLD CALC: 37.1 % — SIGNIFICANT CHANGE UP (ref 34.5–45)
HGB BLD-MCNC: 12.4 G/DL — SIGNIFICANT CHANGE UP (ref 11.5–15.5)
MCHC RBC-ENTMCNC: 33.2 PG — SIGNIFICANT CHANGE UP (ref 27–34)
MCHC RBC-ENTMCNC: 33.4 GM/DL — SIGNIFICANT CHANGE UP (ref 32–36)
MCV RBC AUTO: 99.5 FL — SIGNIFICANT CHANGE UP (ref 80–100)
PLATELET # BLD AUTO: 233 K/UL — SIGNIFICANT CHANGE UP (ref 150–400)
POTASSIUM SERPL-MCNC: 4.3 MMOL/L — SIGNIFICANT CHANGE UP (ref 3.5–5.3)
POTASSIUM SERPL-SCNC: 4.3 MMOL/L — SIGNIFICANT CHANGE UP (ref 3.5–5.3)
RBC # BLD: 3.73 M/UL — LOW (ref 3.8–5.2)
RBC # FLD: 12.2 % — SIGNIFICANT CHANGE UP (ref 10.3–14.5)
SODIUM SERPL-SCNC: 138 MMOL/L — SIGNIFICANT CHANGE UP (ref 135–145)
WBC # BLD: 12.76 K/UL — HIGH (ref 3.8–10.5)
WBC # FLD AUTO: 12.76 K/UL — HIGH (ref 3.8–10.5)

## 2019-01-16 RX ORDER — HYDROMORPHONE HYDROCHLORIDE 2 MG/ML
4 INJECTION INTRAMUSCULAR; INTRAVENOUS; SUBCUTANEOUS EVERY 4 HOURS
Qty: 0 | Refills: 0 | Status: DISCONTINUED | OUTPATIENT
Start: 2019-01-16 | End: 2019-01-17

## 2019-01-16 RX ORDER — HYDROMORPHONE HYDROCHLORIDE 2 MG/ML
2 INJECTION INTRAMUSCULAR; INTRAVENOUS; SUBCUTANEOUS EVERY 4 HOURS
Qty: 0 | Refills: 0 | Status: DISCONTINUED | OUTPATIENT
Start: 2019-01-16 | End: 2019-01-17

## 2019-01-16 RX ORDER — ENOXAPARIN SODIUM 100 MG/ML
40 INJECTION SUBCUTANEOUS AT BEDTIME
Qty: 0 | Refills: 0 | Status: DISCONTINUED | OUTPATIENT
Start: 2019-01-16 | End: 2019-01-17

## 2019-01-16 RX ORDER — CYCLOBENZAPRINE HYDROCHLORIDE 10 MG/1
10 TABLET, FILM COATED ORAL
Qty: 0 | Refills: 0 | Status: DISCONTINUED | OUTPATIENT
Start: 2019-01-16 | End: 2019-01-17

## 2019-01-16 RX ADMIN — CYCLOBENZAPRINE HYDROCHLORIDE 10 MILLIGRAM(S): 10 TABLET, FILM COATED ORAL at 14:08

## 2019-01-16 RX ADMIN — Medication 5 MILLIGRAM(S): at 01:46

## 2019-01-16 RX ADMIN — HYDROMORPHONE HYDROCHLORIDE 4 MILLIGRAM(S): 2 INJECTION INTRAMUSCULAR; INTRAVENOUS; SUBCUTANEOUS at 14:08

## 2019-01-16 RX ADMIN — Medication 5 MILLIGRAM(S): at 10:59

## 2019-01-16 RX ADMIN — Medication 100 MILLIGRAM(S): at 19:00

## 2019-01-16 RX ADMIN — ENOXAPARIN SODIUM 40 MILLIGRAM(S): 100 INJECTION SUBCUTANEOUS at 21:04

## 2019-01-16 RX ADMIN — HYDROMORPHONE HYDROCHLORIDE 4 MILLIGRAM(S): 2 INJECTION INTRAMUSCULAR; INTRAVENOUS; SUBCUTANEOUS at 21:36

## 2019-01-16 RX ADMIN — CYCLOBENZAPRINE HYDROCHLORIDE 10 MILLIGRAM(S): 10 TABLET, FILM COATED ORAL at 22:28

## 2019-01-16 RX ADMIN — Medication 100 MILLIGRAM(S): at 10:58

## 2019-01-16 RX ADMIN — HYDROMORPHONE HYDROCHLORIDE 30 MILLILITER(S): 2 INJECTION INTRAMUSCULAR; INTRAVENOUS; SUBCUTANEOUS at 07:46

## 2019-01-16 RX ADMIN — HYDROMORPHONE HYDROCHLORIDE 4 MILLIGRAM(S): 2 INJECTION INTRAMUSCULAR; INTRAVENOUS; SUBCUTANEOUS at 21:05

## 2019-01-16 RX ADMIN — HYDROMORPHONE HYDROCHLORIDE 30 MILLILITER(S): 2 INJECTION INTRAMUSCULAR; INTRAVENOUS; SUBCUTANEOUS at 01:48

## 2019-01-16 RX ADMIN — Medication 100 MILLIGRAM(S): at 01:46

## 2019-01-16 RX ADMIN — SENNA PLUS 2 TABLET(S): 8.6 TABLET ORAL at 21:05

## 2019-01-16 RX ADMIN — Medication 100 MILLIGRAM(S): at 21:05

## 2019-01-16 RX ADMIN — Medication 5 MILLIGRAM(S): at 18:55

## 2019-01-16 RX ADMIN — Medication 100 MILLIGRAM(S): at 14:08

## 2019-01-16 NOTE — PROGRESS NOTE ADULT - SUBJECTIVE AND OBJECTIVE BOX
SUBJECTIVE: Pt seen and examined, pt is c/o severe neck pain/spasm, meds adjusted, heat packs applied    OVERNIGHT EVENTS: none    Vital Signs Last 24 Hrs  T(C): 36.6 (16 Jan 2019 09:56), Max: 36.9 (15 Robbin 2019 23:32)  T(F): 97.8 (16 Jan 2019 09:56), Max: 98.4 (15 Robbin 2019 23:32)  HR: 70 (16 Jan 2019 09:56) (59 - 82)  BP: 113/65 (16 Jan 2019 09:56) (89/62 - 115/73)  BP(mean): 80 (15 Robbin 2019 18:31) (71 - 90)  RR: 18 (16 Jan 2019 09:56) (16 - 18)  SpO2: 99% (16 Jan 2019 09:56) (96% - 100%)    PHYSICAL EXAM:    General: No Acute Distress     Neurological: Awake, alert oriented to person, place and time, Following Commands, Face Symmetrical, Speech Fluent, Moving all extremities, Muscle Strength normal in all four extremities, No Drift, Sensation to Light Touch Intact    Pulmonary: Clear to Auscultation, No Rales, No Rhonchi, No Wheezes     Cardiovascular: S1, S2, Regular Rate and Rhythm     Gastrointestinal: Soft, Nontender, Nondistended     Incision: posterior neck aquacel AG  dressing c/d/i    LABS:                        12.4   12.76 )-----------( 233      ( 16 Jan 2019 08:17 )             37.1    01-16    138  |  106  |  9   ----------------------------<  97  4.3   |  23  |  0.57    Ca    9.2      16 Jan 2019 07:08          01-15 @ 07:01  -  01-16 @ 07:00  --------------------------------------------------------  IN: 2315 mL / OUT: 535 mL / NET: 1780 mL      DRAINS: HMV (135 x 24 hrs)    MEDICATIONS:  Antibiotics:  clindamycin IVPB 900 milliGRAM(s) IV Intermittent every 8 hours    Neuro:  cyclobenzaprine 10 milliGRAM(s) Oral <User Schedule>  diazepam    Tablet 5 milliGRAM(s) Oral every 8 hours  diphenhydrAMINE 25 milliGRAM(s) Oral every 4 hours PRN Pruritus  HYDROmorphone   Tablet 2 milliGRAM(s) Oral every 4 hours PRN Moderate Pain (4 - 6)  HYDROmorphone   Tablet 4 milliGRAM(s) Oral every 4 hours PRN Severe Pain (7 - 10)  ondansetron Injectable 4 milliGRAM(s) IV Push every 6 hours PRN Nausea    Cardiac:    Pulm:    GI/:  docusate sodium 100 milliGRAM(s) Oral three times a day  senna 2 Tablet(s) Oral at bedtime    Other:   dexamethasone  Injectable 4 milliGRAM(s) IV Push every 6 hours PRN Nausea, IF ondansetron is ineffective after 30 - 60 minute    DIET: [x] Regular [] CCD [] Renal [] Puree [] Dysphagia [] Tube Feeds:     IMAGING:

## 2019-01-16 NOTE — CHART NOTE - NSCHARTNOTEFT_GEN_A_CORE
CAPRINI SCORE [CLOT] Score on Admission for     AGE RELATED RISK FACTORS                                                       MOBILITY RELATED FACTORS  [x ] Age 41-60 years                                            (1 Point)                  [ ] Bed rest                                                        (1 Point)  [ ] Age: 61-74 years                                           (2 Points)                 [ ] Plaster cast                                                   (2 Points)  [ ] Age= 75 years                                              (3 Points)                 [ ] Bed bound for more than 72 hours                 (2 Points)    DISEASE RELATED RISK FACTORS                                               GENDER SPECIFIC FACTORS  [ ] Edema in the lower extremities                       (1 Point)                  [ ] Pregnancy                                                     (1 Point)  [ ] Varicose veins                                               (1 Point)                  [ ] Post-partum < 6 weeks                                   (1 Point)             [ ] BMI > 25 Kg/m2                                            (1 Point)                  [ ] Hormonal therapy  or oral contraception          (1 Point)                 [ ] Sepsis (in the previous month)                        (1 Point)                  [ ] History of pregnancy complications                 (1 point)  [ ] Pneumonia or serious lung disease                                               [ ] Unexplained or recurrent                     (1 Point)           (in the previous month)                               (1 Point)  [ ] Abnormal pulmonary function test                     (1 Point)                 SURGERY RELATED RISK FACTORS (include planned surgeries)  [ ] Acute myocardial infarction                              (1 Point)                 [ ]  Section                                             (1 Point)  [ ] Congestive heart failure (in the previous month)  (1 Point)               [ ] Minor surgery                                                  (1 Point)   [ ] Inflammatory bowel disease                             (1 Point)                 [ ] Arthroscopic surgery                                        (2 Points)  [ ] Central venous access                                      (2 Points)                [ x] General surgery lasting more than 45 minutes   (2 Points)       [ ] Stroke (in the previous month)                          (5 Points)               [ ] Elective arthroplasty                                         (5 Points)                                                                                                                                               HEMATOLOGY RELATED FACTORS                                                 TRAUMA RELATED RISK FACTORS  [ ] Prior episodes of VTE                                     (3 Points)                [ ] Fracture of the hip, pelvis, or leg                       (5 Points)  [ ] Positive family history for VTE                         (3 Points)                 [ ] Acute spinal cord injury (in the previous month)  (5 Points)  [ ] Prothrombin 11985 A                                     (3 Points)                 [ ] Paralysis  (less than 1 month)                             (5 Points)  [ ] Factor V Leiden                                             (3 Points)                  [ ] Multiple Trauma within 1 month                        (5 Points)  [ ] Lupus anticoagulants                                     (3 Points)                                                           [ ] Anticardiolipin antibodies                               (3 Points)                                                       [ ] High homocysteine in the blood                      (3 Points)                                             [ ] Other congenital or acquired thrombophilia      (3 Points)                                                [ ] Heparin induced thrombocytopenia                  (3 Points)                                          Total Score [      3    ]    Risk:  Very low 0   Low 1 to 2   Moderate 3 to 4   High =5       VTE Prophylasix Recommednations:  [ x] mechanical pneumatic compression devices                                      [ ] contraindicated: _____________________  [ x] chemo prophylasix                                                                                   [ ] contraindicated _____________________    **** HIGH LIKELIHOOD DVT PRESENT ON ADMISSION  [ ] (please order LE dopplers within 24 hours of admission)

## 2019-01-16 NOTE — PROGRESS NOTE ADULT - ASSESSMENT
52 y/o F PMH asymptomatic MVP, mild MELI, no treatment recommended, EDS type 3, TMJ, idiopathic intracranial hypertension diagnosed approximately 6 years ago, was treated with Diamox without relief, c/o persistent headaches, blurred vision, and balance issues, was referred to Dr. Tipton, found to have Chiari and hydrocephalus on imaging followed by neurosurgery s/p marie hole for insertion of intracranial pressure monitor in 11/6/2018. Presents to PST for scheduled  C1 Sub Occipital Cranio- cervical Laminectomy  , Possible Duraplasty on 1/15/2018.     PROCEDURE: 1/15 s/p C1 sub-occipital cranio-cervical laminectomy  possible duraplasty     POD#1    PLAN:  Neuro:   - pain control- dc pca, start dilaudid po prn, pt reports she had a reaction in the past to oxycodone (rash)  - muscle spasm- on valium 5 q 8, added flexerill 10 q8 atc for severe spasm  - hot packs as needed to neck  Respiratory:   - encouraged incentive spirometry  CV:  - vital signs stable   DVT ppx:   - venodynes, sq lovenox   PT/OT:   -PT is TBD      Assessment:  Please Check When Present   []  GCS  E   V  M     Heart Failure: []Acute, [] acute on chronic , []chronic  Heart Failure:  [] Diastolic (HFpEF), [] Systolic (HFrEF), []Combined (HFpEF and HFrEF), [] RHF, [] Pulm HTN, [] Other    [] DEWAYNE, [] ATN, [] AIN, [] other  [] CKD1, [] CKD2, [] CKD 3, [] CKD 4, [] CKD 5, []ESRD    Encephalopathy: [] Metabolic, [] Hepatic, [] toxic, [] Neurological, [] Other    Abnormal Nurtitional Status: [] malnurtition (see nutrition note), [ ]underweight: BMI < 19, [] morbid obesity: BMI >40, [] Cachexia    [] Sepsis  [] hypovolemic shock,[] cardiogenic shock, [] hemorrhagic shock, [] neuogenic shock  [] Acute Respiratory Failure  []Cerebral edema, [] Brain compression/ herniation,   [] Functional quadriplegia  [] Acute blood loss anemia    Spectralink # [id number]

## 2019-01-16 NOTE — PROGRESS NOTE ADULT - SUBJECTIVE AND OBJECTIVE BOX
Day 1 of Anesthesia Pain Management Service    SUBJECTIVE: Patient is doing well with IV PCA    Pain Scale Score:	[X] Refer to charted pain scores    THERAPY:    [ ] IV PCA Morphine		[ ] 5 mg/mL	[ ] 1 mg/mL  [X] IV PCA Hydromorphone	[ ] 5 mg/mL	[X] 1 mg/mL  [ ] IV PCA Fentanyl		[ ] 50 micrograms/mL    Demand dose: 0.2 mg     Lockout: 6 minutes   Continuous Rate: 0 mg/hr  4 Hour Limit: 4 mg    MEDICATIONS  (STANDING):  clindamycin IVPB 900 milliGRAM(s) IV Intermittent every 8 hours  diazepam    Tablet 5 milliGRAM(s) Oral every 8 hours  docusate sodium 100 milliGRAM(s) Oral three times a day  HYDROmorphone PCA (1 mG/mL) 30 milliLiter(s) PCA Continuous PCA Continuous  senna 2 Tablet(s) Oral at bedtime  sodium chloride 0.9% with potassium chloride 20 mEq/L 1000 milliLiter(s) (85 mL/Hr) IV Continuous <Continuous>    MEDICATIONS  (PRN):  dexamethasone  Injectable 4 milliGRAM(s) IV Push every 6 hours PRN Nausea, IF ondansetron is ineffective after 30 - 60 minute  diphenhydrAMINE 25 milliGRAM(s) Oral every 4 hours PRN Pruritus  HYDROmorphone PCA (1 mG/mL) Rescue Clinician Bolus 0.5 milliGRAM(s) IV Push every 15 minutes PRN for Pain Scale GREATER THAN 6  naloxone Injectable 0.1 milliGRAM(s) IV Push every 3 minutes PRN For ANY of the following changes in patient status:  A. RR LESS THAN 10 breaths per minute, B. Oxygen saturation LESS THAN 90%, C. Sedation score of 6  ondansetron Injectable 4 milliGRAM(s) IV Push every 6 hours PRN Nausea      OBJECTIVE:    Sedation Score:	[ X] Alert	[ ] Drowsy 	[ ] Arousable	[ ] Asleep	[ ] Unresponsive    Side Effects:	[X ] None	[ ] Nausea	[ ] Vomiting	[ ] Pruritus  		[ ] Other:    Vital Signs Last 24 Hrs  T(C): 36.2 (16 Jan 2019 05:40), Max: 36.9 (15 Robbin 2019 23:32)  T(F): 97.2 (16 Jan 2019 05:40), Max: 98.4 (15 Robbin 2019 23:32)  HR: 70 (16 Jan 2019 05:40) (59 - 82)  BP: 107/70 (16 Jan 2019 05:40) (89/62 - 115/73)  BP(mean): 80 (15 Robbin 2019 18:31) (71 - 90)  RR: 16 (16 Jan 2019 05:40) (16 - 16)  SpO2: 97% (16 Jan 2019 05:40) (96% - 100%)    ASSESSMENT/ PLAN    Therapy to  be:               [X] Continued   [ ] Discontinued   [ ] Changed to PRN Analgesics    Documentation and Verification of current medications:   [X] Done	[ ] Not done, not eligible    Comments: Using 0-1x/hr. Reeducated to use.

## 2019-01-16 NOTE — PROGRESS NOTE ADULT - ATTENDING COMMENTS
I saw and evaluated the patient. The patient is a 53-year-old female s/p suboccipital craniectomy and C1 laminectomy on 1/15/19. The patient is currently neurologically stable Continue Hemovac drain for now. Begin mobilization OOB with PT and disposition planning.

## 2019-01-17 ENCOUNTER — TRANSCRIPTION ENCOUNTER (OUTPATIENT)
Age: 54
End: 2019-01-17

## 2019-01-17 VITALS
RESPIRATION RATE: 18 BRPM | OXYGEN SATURATION: 97 % | HEART RATE: 86 BPM | DIASTOLIC BLOOD PRESSURE: 79 MMHG | TEMPERATURE: 98 F | SYSTOLIC BLOOD PRESSURE: 114 MMHG

## 2019-01-17 LAB
ANION GAP SERPL CALC-SCNC: 10 MMOL/L — SIGNIFICANT CHANGE UP (ref 5–17)
BUN SERPL-MCNC: 9 MG/DL — SIGNIFICANT CHANGE UP (ref 7–23)
CALCIUM SERPL-MCNC: 9.4 MG/DL — SIGNIFICANT CHANGE UP (ref 8.4–10.5)
CHLORIDE SERPL-SCNC: 102 MMOL/L — SIGNIFICANT CHANGE UP (ref 96–108)
CO2 SERPL-SCNC: 28 MMOL/L — SIGNIFICANT CHANGE UP (ref 22–31)
CREAT SERPL-MCNC: 0.69 MG/DL — SIGNIFICANT CHANGE UP (ref 0.5–1.3)
GLUCOSE SERPL-MCNC: 100 MG/DL — HIGH (ref 70–99)
HCT VFR BLD CALC: 40.3 % — SIGNIFICANT CHANGE UP (ref 34.5–45)
HGB BLD-MCNC: 13.4 G/DL — SIGNIFICANT CHANGE UP (ref 11.5–15.5)
MCHC RBC-ENTMCNC: 32.8 PG — SIGNIFICANT CHANGE UP (ref 27–34)
MCHC RBC-ENTMCNC: 33.3 GM/DL — SIGNIFICANT CHANGE UP (ref 32–36)
MCV RBC AUTO: 98.8 FL — SIGNIFICANT CHANGE UP (ref 80–100)
PLATELET # BLD AUTO: 236 K/UL — SIGNIFICANT CHANGE UP (ref 150–400)
POTASSIUM SERPL-MCNC: 4.1 MMOL/L — SIGNIFICANT CHANGE UP (ref 3.5–5.3)
POTASSIUM SERPL-SCNC: 4.1 MMOL/L — SIGNIFICANT CHANGE UP (ref 3.5–5.3)
RBC # BLD: 4.08 M/UL — SIGNIFICANT CHANGE UP (ref 3.8–5.2)
RBC # FLD: 12.6 % — SIGNIFICANT CHANGE UP (ref 10.3–14.5)
SODIUM SERPL-SCNC: 140 MMOL/L — SIGNIFICANT CHANGE UP (ref 135–145)
WBC # BLD: 9.15 K/UL — SIGNIFICANT CHANGE UP (ref 3.8–10.5)
WBC # FLD AUTO: 9.15 K/UL — SIGNIFICANT CHANGE UP (ref 3.8–10.5)

## 2019-01-17 PROCEDURE — 97530 THERAPEUTIC ACTIVITIES: CPT

## 2019-01-17 PROCEDURE — 97161 PT EVAL LOW COMPLEX 20 MIN: CPT

## 2019-01-17 PROCEDURE — 81025 URINE PREGNANCY TEST: CPT

## 2019-01-17 PROCEDURE — 80048 BASIC METABOLIC PNL TOTAL CA: CPT

## 2019-01-17 PROCEDURE — 85027 COMPLETE CBC AUTOMATED: CPT

## 2019-01-17 PROCEDURE — 97116 GAIT TRAINING THERAPY: CPT

## 2019-01-17 PROCEDURE — C1769: CPT

## 2019-01-17 PROCEDURE — C1889: CPT

## 2019-01-17 RX ORDER — CYCLOBENZAPRINE HYDROCHLORIDE 10 MG/1
1 TABLET, FILM COATED ORAL
Qty: 0 | Refills: 0 | DISCHARGE
Start: 2019-01-17

## 2019-01-17 RX ORDER — CYCLOBENZAPRINE HYDROCHLORIDE 10 MG/1
1 TABLET, FILM COATED ORAL
Qty: 30 | Refills: 0
Start: 2019-01-17

## 2019-01-17 RX ORDER — DIAZEPAM 5 MG
1 TABLET ORAL
Qty: 30 | Refills: 0
Start: 2019-01-17

## 2019-01-17 RX ADMIN — Medication 100 MILLIGRAM(S): at 05:18

## 2019-01-17 RX ADMIN — Medication 100 MILLIGRAM(S): at 03:21

## 2019-01-17 RX ADMIN — Medication 5 MILLIGRAM(S): at 03:22

## 2019-01-17 RX ADMIN — Medication 5 MILLIGRAM(S): at 10:25

## 2019-01-17 RX ADMIN — CYCLOBENZAPRINE HYDROCHLORIDE 10 MILLIGRAM(S): 10 TABLET, FILM COATED ORAL at 05:19

## 2019-01-17 RX ADMIN — Medication 100 MILLIGRAM(S): at 10:25

## 2019-01-17 NOTE — PROGRESS NOTE ADULT - ASSESSMENT
HPI:  52 y/o F PMH asymptomatic MVP, mild MELI, no treatment recommended, EDS type 3, TMJ, idiopathic intracranial hypertension diagnosed approximately 6 years ago, was treated with Diamox without relief, c/o persistent headaches, blurred vision, and balance issues, was referred to Dr. Tipton, found to have Chiari and hydrocephalus on imaging.  S/P marie hole for insertion of intracranial pressure monitor on 11/6/2018.     PROCEDURE: S/P SOC, C1 lami      POD# 2    PLAN:  NEURO: HMV d/greg, continue valium/ flexeril for muscle spasms, will change dilaudid to Percocet  PULM: room air, incentive spirometry  CV: VSS  HEME/ONC:   H/H stable            DVT ppx: [X] SQL [] SQH [X Venodynes bilaterally   RENAL: IVL  ID: afebrile  GI: regular diet, bowel regimen  DISCHARGE PLANNING: will discharge patient home today- cleared by PT      Assessment:  Please Check When Present   []  GCS  E   V  M     Heart Failure: []Acute, [] acute on chronic , []chronic  Heart Failure:  [] Diastolic (HFpEF), [] Systolic (HFrEF), []Combined (HFpEF and HFrEF), [] RHF, [] Pulm HTN, [] Other    [] DEWAYNE, [] ATN, [] AIN, [] other  [] CKD1, [] CKD2, [] CKD 3, [] CKD 4, [] CKD 5, []ESRD    Encephalopathy: [] Metabolic, [] Hepatic, [] toxic, [] Neurological, [] Other    Abnormal Nurtitional Status: [] malnurtition (see nutrition note), [ ]underweight: BMI < 19, [] morbid obesity: BMI >40, [] Cachexia    [] Sepsis  [] hypovolemic shock,[] cardiogenic shock, [] hemorrhagic shock, [] neuogenic shock  [] Acute Respiratory Failure  []Cerebral edema, [] Brain compression/ herniation,   [] Functional quadriplegia  [] Acute blood loss anemia    Will discuss plan with Dr. Bulmaro Drake [id number]

## 2019-01-17 NOTE — PROGRESS NOTE ADULT - SUBJECTIVE AND OBJECTIVE BOX
SUBJECTIVE: Patient see, sitting up in bed.   States that pain is improved.  Wants to go home.     CHIEF COMPLAINT: Chiari    OVERNIGHT EVENTS: none    Vital Signs Last 24 Hrs  T(C): 36.7 (17 Jan 2019 09:20), Max: 36.8 (17 Jan 2019 04:35)  T(F): 98.1 (17 Jan 2019 09:20), Max: 98.2 (17 Jan 2019 04:35)  HR: 86 (17 Jan 2019 09:20) (68 - 86)  BP: 114/79 (17 Jan 2019 09:20) (110/68 - 117/74)  BP(mean): --  RR: 18 (17 Jan 2019 09:20) (18 - 18)  SpO2: 97% (17 Jan 2019 09:20) (97% - 98%)    PHYSICAL EXAM:    General: No Acute Distress     Neurological: Awake, alert oriented to person, place and time, Following Commands, PERRL, EOMI, Face Symmetrical, Speech Fluent, Moving all extremities, Muscle Strength normal in all four extremities, No Drift, Sensation to Light Touch Intact    Pulmonary: Clear to Auscultation, No Rales, No Rhonchi, No Wheezes     Cardiovascular: S1, S2, Regular Rate and Rhythm     Gastrointestinal: Soft, Nontender, Nondistended     Incision: + HMV (30cc)- d/greg; + sutures, clean and dry, min erythema     LABS:                        13.4   9.15  )-----------( 236      ( 17 Jan 2019 07:00 )             40.3    01-17    140  |  102  |  9   ----------------------------<  100<H>  4.1   |  28  |  0.69    Ca    9.4      17 Jan 2019 05:59          01-16 @ 07:01  -  01-17 @ 07:00  --------------------------------------------------------  IN: 740 mL / OUT: 50 mL / NET: 690 mL    01-17 @ 07:01 - 01-17 @ 11:17  --------------------------------------------------------  IN: 220 mL / OUT: 0 mL / NET: 220 mL      DRAINS: HMV 30cc    MEDICATIONS:  Antibiotics:    Neuro:  cyclobenzaprine 10 milliGRAM(s) Oral <User Schedule>  diazepam    Tablet 5 milliGRAM(s) Oral every 8 hours  diphenhydrAMINE 25 milliGRAM(s) Oral every 4 hours PRN Pruritus  HYDROmorphone   Tablet 2 milliGRAM(s) Oral every 4 hours PRN Moderate Pain (4 - 6)  HYDROmorphone   Tablet 4 milliGRAM(s) Oral every 4 hours PRN Severe Pain (7 - 10)  ondansetron Injectable 4 milliGRAM(s) IV Push every 6 hours PRN Nausea    Cardiac:    Pulm:    GI/:  docusate sodium 100 milliGRAM(s) Oral three times a day  senna 2 Tablet(s) Oral at bedtime    Other:   dexamethasone  Injectable 4 milliGRAM(s) IV Push every 6 hours PRN Nausea, IF ondansetron is ineffective after 30 - 60 minute  enoxaparin Injectable 40 milliGRAM(s) SubCutaneous at bedtime    DIET: [X] Regular [] CCD [] Renal [] Puree [] Dysphagia [] Tube Feeds:     IMAGING: none

## 2019-01-17 NOTE — DISCHARGE NOTE ADULT - PLAN OF CARE
Please make follow up appointment with Dr. Chamberlain. Please keep wound clean and dry.  Call office if any discharge noted from wound, any worsening pain or headaches.  Take medications prescribed as needed for pain/ muscle spasms. Please make follow up appointment with PMD.

## 2019-01-17 NOTE — PROGRESS NOTE ADULT - ATTENDING COMMENTS
I saw and evaluated the patient. The patient is a 53-year-old female s/p suboccipital craniectomy and C1 laminectomy on 1/15/19. The patient is currently neurologically stable. D/C Hemovac drain. Continue mobilization OOB with PT, and plan for disposition home with outpatient PT.

## 2019-01-17 NOTE — DISCHARGE NOTE ADULT - NS AS ACTIVITY OBS
Walking-Indoors allowed/Walking-Outdoors allowed/Do not drive or operate machinery/No Heavy lifting/straining/Showering allowed

## 2019-01-17 NOTE — DISCHARGE NOTE ADULT - CARE PROVIDER_API CALL
Marv Chamberlain (DO), Neurological Surgery  [address] [place] [address]  [address]  [place] [address]  Phone: [phone]  Fax: [phone]

## 2019-01-17 NOTE — DISCHARGE NOTE ADULT - PATIENT PORTAL LINK FT
You can access the [facility], offered by [facility], by registering with the following website: [email or web address]

## 2019-01-17 NOTE — DISCHARGE NOTE ADULT - MEDICATION SUMMARY - MEDICATIONS TO TAKE
I will START or STAY ON the medications listed below when I get home from the hospital:    Percocet 5/325 oral tablet  -- 1 tab(s) by mouth every 6 hours MDD:4 tabs  -- Caution federal law prohibits the transfer of this drug to any person other  than the person for whom it was prescribed.  May cause drowsiness.  Alcohol may intensify this effect.  Use care when operating dangerous machinery.  This prescription cannot be refilled.  This product contains acetaminophen.  Do not use  with any other product containing acetaminophen to prevent possible liver damage.  Using more of this medication than prescribed may cause serious breathing problems.    -- Indication: For pain as needed    diazePAM 5 mg oral tablet  -- 1 tab(s) by mouth every 8 hours as needed for muscle spasms MDD:3 tabs  -- Indication: For muscle spasms    cyclobenzaprine 10 mg oral tablet  -- 1 tab(s) by mouth 3 times a day as needed for muscle spasms  -- Indication: For muscle spasms

## 2019-01-17 NOTE — DISCHARGE NOTE ADULT - CARE PLAN
Principal Discharge DX:	Chiari I malformation  Goal:	Please make follow up appointment with Dr. Chamberlain.  Assessment and plan of treatment:	Please keep wound clean and dry.  Call office if any discharge noted from wound, any worsening pain or headaches.  Take medications prescribed as needed for pain/ muscle spasms.  Secondary Diagnosis:	EDS (Charlotte-Danlos syndrome)  Goal:	Please make follow up appointment with PMD.

## 2019-01-17 NOTE — DISCHARGE NOTE ADULT - HOSPITAL COURSE
53 year old female with history of MVP, mild obstructive sleep apnea, Charlotte Danlos type 3, TMJ, Idiopathic intracranial hypertension diagnosed approximately 6 years ago, was treated with Diamox without relief, presents this admission with persistent headaches, blurred vision, and balance issues. Patient  was referred to Dr. Tipton and on further workup was found to have Chiari and hydrocephalus. Patient underwent a ICP monitor trial on 11/6/2018. On 1/15/19, patient underwent a suboccipital craniotomy with C1 laminectomy.  Postop patient was seen and cleared by physical therapy for discharge home.  Patient's pain is well controlled, on valium and flexeril for muscle spasms.  Will discharge patient home today.  Instructed to follow up with Dr. Chamberlain and PMD.

## 2019-01-21 NOTE — PATIENT PROFILE ADULT - NSALCOHOLAMT_GEN_A_NUR
1-2 drinks Nsaids Counseling: NSAID Counseling: I discussed with the patient that NSAIDs should be taken with food. Prolonged use of NSAIDs can result in the development of stomach ulcers.  Patient advised to stop taking NSAIDs if abdominal pain occurs.  The patient verbalized understanding of the proper use and possible adverse effects of NSAIDs.  All of the patient's questions and concerns were addressed.

## 2019-01-31 ENCOUNTER — APPOINTMENT (OUTPATIENT)
Dept: SPINE | Facility: CLINIC | Age: 54
End: 2019-01-31
Payer: COMMERCIAL

## 2019-01-31 VITALS
OXYGEN SATURATION: 96 % | DIASTOLIC BLOOD PRESSURE: 61 MMHG | SYSTOLIC BLOOD PRESSURE: 92 MMHG | WEIGHT: 140 LBS | HEIGHT: 66 IN | RESPIRATION RATE: 15 BRPM | HEART RATE: 93 BPM | BODY MASS INDEX: 22.5 KG/M2

## 2019-01-31 PROCEDURE — 99024 POSTOP FOLLOW-UP VISIT: CPT

## 2019-01-31 RX ORDER — OXYCODONE HYDROCHLORIDE 10 MG/1
10 TABLET, FILM COATED, EXTENDED RELEASE ORAL
Refills: 0 | Status: DISCONTINUED | COMMUNITY
End: 2019-01-31

## 2019-01-31 RX ORDER — MUPIROCIN CALCIUM 20 MG/G
2 OINTMENT TOPICAL
Qty: 1 | Refills: 0 | Status: DISCONTINUED | COMMUNITY
Start: 2019-01-04 | End: 2019-01-31

## 2019-01-31 NOTE — REVIEW OF SYSTEMS
[Hand Weakness] :  hand weakness [Numbness] : numbness [Tingling] : tingling [Migraine Headache] : migraine headaches [Ataxia] : ataxia [As Noted in HPI] : as noted in HPI [Arthralgias] : arthralgias [Negative] : Heme/Lymph [FreeTextEntry3] : blurry vision, photophobia

## 2019-01-31 NOTE — PHYSICAL EXAM
[General Appearance - Alert] : alert [General Appearance - In No Acute Distress] : in no acute distress [Longitudinal] : longitudinal [Clean] : clean [Dry] : dry [Healing Well] : healing well [No Drainage] : without drainage [Normal Skin] : normal [Normal Skin Turgor] : skin turgor was normal [Oriented To Time, Place, And Person] : oriented to person, place, and time [Impaired Insight] : insight and judgment were intact [Affect] : the affect was normal [Person] : oriented to person [Place] : oriented to place [Time] : oriented to time [Short Term Intact] : short term memory intact [Remote Intact] : remote memory intact [Span Intact] : the attention span was normal [Concentration Intact] : normal concentrating ability [Fluency] : fluency intact [Comprehension] : comprehension intact [Current Events] : adequate knowledge of current events [Past History] : adequate knowledge of personal past history [Vocabulary] : adequate range of vocabulary [Cranial Nerves Optic (II)] : visual acuity intact bilaterally,  pupils equal round and reactive to light [Cranial Nerves Oculomotor (III)] : extraocular motion intact [Cranial Nerves Trigeminal (V)] : facial sensation intact symmetrically [Cranial Nerves Facial (VII)] : face symmetrical [Cranial Nerves Vestibulocochlear (VIII)] : hearing was intact bilaterally [Cranial Nerves Glossopharyngeal (IX)] : tongue and palate midline [Cranial Nerves Accessory (XI - Cranial And Spinal)] : head turning and shoulder shrug symmetric [Cranial Nerves Hypoglossal (XII)] : there was no tongue deviation with protrusion [Motor Strength] : muscle strength was normal in all four extremities [No Muscle Atrophy] : normal bulk in all four extremities [Sensation Tactile Decrease] : light touch was intact [Balance] : balance was intact [Past-pointing] : there was no past-pointing [Tremor] : no tremor present [2+] : Patella left 2+ [L'Hermitte's] : neck flexion did not produce tingling down the spine/arms [Spurling's - Opposite Side] : Negative Spurling's on opposite side [Spurling's Same Side] : Negative Spurling's on same side [No Visual Abnormalities] : no visible abnormailities [No Tenderness to Palpation] : no spine tenderness on palpation [Full ROM] : full ROM [No Pain with ROM] : no pain with motion in any direction [Straight-Leg Raise Test - Left] : straight leg raise of the left leg was negative [Straight-Leg Raise Test - Right] : straight leg raise  of the right leg was negative [Normal] : normal [Intact] : all reflexes within normal limits bilaterally [Sclera] : the sclera and conjunctiva were normal [PERRL With Normal Accommodation] : pupils were equal in size, round, reactive to light, with normal accommodation [Extraocular Movements] : extraocular movements were intact [Outer Ear] : the ears and nose were normal in appearance [Oropharynx] : the oropharynx was normal [Neck Appearance] : the appearance of the neck was normal [Neck Cervical Mass (___cm)] : no neck mass was observed [Jugular Venous Distention Increased] : there was no jugular-venous distention [Thyroid Diffuse Enlargement] : the thyroid was not enlarged [Thyroid Nodule] : there were no palpable thyroid nodules [Auscultation Breath Sounds / Voice Sounds] : lungs were clear to auscultation bilaterally [Heart Rate And Rhythm] : heart rate was normal and rhythm regular [Heart Sounds] : normal S1 and S2 [Heart Sounds Gallop] : no gallops [Murmurs] : no murmurs [Heart Sounds Pericardial Friction Rub] : no pericardial rub [Full Pulse] : the pedal pulses are present [Edema] : there was no peripheral edema [Bowel Sounds] : normal bowel sounds [Abdomen Soft] : soft [Abdomen Tenderness] : non-tender [Abdomen Mass (___ Cm)] : no abdominal mass palpated [No CVA Tenderness] : no ~M costovertebral angle tenderness [No Spinal Tenderness] : no spinal tenderness [Abnormal Walk] : normal gait [Nail Clubbing] : no clubbing  or cyanosis of the fingernails [Musculoskeletal - Swelling] : no joint swelling seen [Motor Tone] : muscle strength and tone were normal [Skin Color & Pigmentation] : normal skin color and pigmentation [Skin Turgor] : normal skin turgor [] : no rash

## 2019-01-31 NOTE — REASON FOR VISIT
[de-identified] : Suboccipital craniectomy, C1 laminectomy [de-identified] : 1/15/19 [Spouse] : spouse

## 2019-01-31 NOTE — HISTORY OF PRESENT ILLNESS
[FreeTextEntry1] : I had the pleasure of seeing Ms. Destiny Bailey for an initial visit in my office today. Ms. Bailey is a pleasant 53-year-old female who unfortunately has been suffering from headaches beginning at the back of head, which worsen when coughing or sneezing. Her headaches are associated with blurry vision, photophobia, ringing in her ears, neck pain, hand weakness as evidenced by her dropping of objects, left greater than right upper and lower extremity numbness and tingling radiating to all of her fingers and her smaller toes, and balance issues. Her symptoms have caused her to stop working as a . \par \par The patient was diagnosed with pseudotumor cerebri 5-6 years ago. The patient was found via a lumbar puncture to have an opening pressure of 22. The patient was also found via an MRV to have left transverse sinus stenosis. However, the patient was not found via Neuro-Ophthalmology to have papilledema. The patient has tried Diamox, with minimal to no relief.\par \par The patient was also diagnosed with Chiari 1 malformation this past September, with approximately 8mm descent of her cerebellar tonsils below the foramen magnum. The patient was evaluated by neurosurgeon Dr. Aaron Tipton and referred to my office for possible surgical intervention. \par \par The patient underwent a right frontal marie hole for insertion of an intracranial pressure monitor on 11/6/18. Her ICPs were between 1 and 8.\par \par The patient is now s/p suboccipital craniectomy and C1 laminectomy on 1/15/19. Postoperatively, the patient notes improvement in her preoperative headache, ringing in her ears, neck pain, upper and lower extremity numbness and tingling, and balance difficulty. Her incision is healing well. Her pin site staples were removed.

## 2019-01-31 NOTE — ASSESSMENT
[FreeTextEntry1] : The patient is a 53-year-old female s/p suboccipital craniectomy and C1 laminectomy on 1/15/19. The patient should follow up with me in 1 month for a 6-week postoperative visit.

## 2019-02-28 ENCOUNTER — APPOINTMENT (OUTPATIENT)
Dept: SPINE | Facility: CLINIC | Age: 54
End: 2019-02-28
Payer: COMMERCIAL

## 2019-02-28 VITALS
SYSTOLIC BLOOD PRESSURE: 86 MMHG | BODY MASS INDEX: 22.5 KG/M2 | HEART RATE: 85 BPM | DIASTOLIC BLOOD PRESSURE: 60 MMHG | OXYGEN SATURATION: 100 % | HEIGHT: 66 IN | RESPIRATION RATE: 15 BRPM | WEIGHT: 140 LBS

## 2019-02-28 PROCEDURE — 99024 POSTOP FOLLOW-UP VISIT: CPT

## 2019-02-28 NOTE — PHYSICAL EXAM
[General Appearance - Alert] : alert [General Appearance - In No Acute Distress] : in no acute distress [Longitudinal] : longitudinal [Clean] : clean [Healing Well] : healing well [No Drainage] : without drainage [Normal Skin] : normal [Normal Skin Turgor] : skin turgor was normal [Oriented To Time, Place, And Person] : oriented to person, place, and time [Impaired Insight] : insight and judgment were intact [Affect] : the affect was normal [Person] : oriented to person [Place] : oriented to place [Time] : oriented to time [Short Term Intact] : short term memory intact [Remote Intact] : remote memory intact [Span Intact] : the attention span was normal [Concentration Intact] : normal concentrating ability [Fluency] : fluency intact [Comprehension] : comprehension intact [Current Events] : adequate knowledge of current events [Past History] : adequate knowledge of personal past history [Vocabulary] : adequate range of vocabulary [Cranial Nerves Optic (II)] : visual acuity intact bilaterally,  pupils equal round and reactive to light [Cranial Nerves Oculomotor (III)] : extraocular motion intact [Cranial Nerves Trigeminal (V)] : facial sensation intact symmetrically [Cranial Nerves Facial (VII)] : face symmetrical [Cranial Nerves Vestibulocochlear (VIII)] : hearing was intact bilaterally [Cranial Nerves Glossopharyngeal (IX)] : tongue and palate midline [Cranial Nerves Accessory (XI - Cranial And Spinal)] : head turning and shoulder shrug symmetric [Cranial Nerves Hypoglossal (XII)] : there was no tongue deviation with protrusion [Motor Strength] : muscle strength was normal in all four extremities [No Muscle Atrophy] : normal bulk in all four extremities [Sensation Tactile Decrease] : light touch was intact [Balance] : balance was intact [Past-pointing] : there was no past-pointing [Tremor] : no tremor present [2+] : Patella left 2+ [L'Hermitte's] : neck flexion did not produce tingling down the spine/arms [Spurling's - Opposite Side] : Negative Spurling's on opposite side [Spurling's Same Side] : Negative Spurling's on same side [No Visual Abnormalities] : no visible abnormailities [No Tenderness to Palpation] : no spine tenderness on palpation [Full ROM] : full ROM [No Pain with ROM] : no pain with motion in any direction [Straight-Leg Raise Test - Left] : straight leg raise of the left leg was negative [Straight-Leg Raise Test - Right] : straight leg raise  of the right leg was negative [Normal] : normal [Intact] : all reflexes within normal limits bilaterally [Sclera] : the sclera and conjunctiva were normal [PERRL With Normal Accommodation] : pupils were equal in size, round, reactive to light, with normal accommodation [Extraocular Movements] : extraocular movements were intact [Outer Ear] : the ears and nose were normal in appearance [Oropharynx] : the oropharynx was normal [Neck Appearance] : the appearance of the neck was normal [Neck Cervical Mass (___cm)] : no neck mass was observed [Jugular Venous Distention Increased] : there was no jugular-venous distention [Thyroid Diffuse Enlargement] : the thyroid was not enlarged [Thyroid Nodule] : there were no palpable thyroid nodules [Auscultation Breath Sounds / Voice Sounds] : lungs were clear to auscultation bilaterally [Heart Rate And Rhythm] : heart rate was normal and rhythm regular [Heart Sounds] : normal S1 and S2 [Heart Sounds Gallop] : no gallops [Murmurs] : no murmurs [Heart Sounds Pericardial Friction Rub] : no pericardial rub [Full Pulse] : the pedal pulses are present [Edema] : there was no peripheral edema [Bowel Sounds] : normal bowel sounds [Abdomen Soft] : soft [Abdomen Tenderness] : non-tender [Abdomen Mass (___ Cm)] : no abdominal mass palpated [No CVA Tenderness] : no ~M costovertebral angle tenderness [No Spinal Tenderness] : no spinal tenderness [Abnormal Walk] : normal gait [Nail Clubbing] : no clubbing  or cyanosis of the fingernails [Musculoskeletal - Swelling] : no joint swelling seen [Motor Tone] : muscle strength and tone were normal [Skin Color & Pigmentation] : normal skin color and pigmentation [Skin Turgor] : normal skin turgor [] : no rash

## 2019-02-28 NOTE — ASSESSMENT
[FreeTextEntry1] : The patient is a 53-year-old female s/p suboccipital craniectomy and C1 laminectomy on 1/15/19. The patient should follow up with me in 6 weeks for a 3-month postoperative visit.

## 2019-02-28 NOTE — HISTORY OF PRESENT ILLNESS
[FreeTextEntry1] : I had the pleasure of seeing Ms. Destiny Bailey for an initial visit in my office today. Ms. Bailey is a pleasant 53-year-old female who unfortunately has been suffering from headaches beginning at the back of head, which worsen when coughing or sneezing. Her headaches are associated with blurry vision, photophobia, ringing in her ears, neck pain, hand weakness as evidenced by her dropping of objects, left greater than right upper and lower extremity numbness and tingling radiating to all of her fingers and her smaller toes, and balance issues. Her symptoms have caused her to stop working as a . \par \par The patient was diagnosed with pseudotumor cerebri 5-6 years ago. The patient was found via a lumbar puncture to have an opening pressure of 22. The patient was also found via an MRV to have left transverse sinus stenosis. However, the patient was not found via Neuro-Ophthalmology to have papilledema. The patient has tried Diamox, with minimal to no relief.\par \par The patient was also diagnosed with Chiari 1 malformation this past September, with approximately 8mm descent of her cerebellar tonsils below the foramen magnum. The patient was evaluated by neurosurgeon Dr. Aaron Tipton and referred to my office for possible surgical intervention. \par \par The patient underwent a right frontal marie hole for insertion of an intracranial pressure monitor on 11/6/18. Her ICPs were between 1 and 8.\par \par The patient is now s/p suboccipital craniectomy and C1 laminectomy on 1/15/19. Postoperatively, the patient notes improvement in her preoperative headache, ringing in her ears, neck pain, and balance difficulty. Her incision is healing well but is slightly erythematous. Her sutures were removed.

## 2019-02-28 NOTE — REVIEW OF SYSTEMS
[Hand Weakness] :  hand weakness [Numbness] : numbness [Tingling] : tingling [As Noted in HPI] : as noted in HPI [Negative] : Heme/Lymph [FreeTextEntry3] : blurry vision, photophobia

## 2019-02-28 NOTE — REASON FOR VISIT
[Follow-Up: _____] : a [unfilled] follow-up visit [Spouse] : spouse [FreeTextEntry1] : Post suboccipital crani 1/15/2019 - 6 week post op for Chiari Malformation/EDS

## 2019-05-30 ENCOUNTER — OTHER (OUTPATIENT)
Age: 54
End: 2019-05-30

## 2019-05-30 ENCOUNTER — APPOINTMENT (OUTPATIENT)
Dept: SPINE | Facility: CLINIC | Age: 54
End: 2019-05-30
Payer: COMMERCIAL

## 2019-05-30 VITALS
BODY MASS INDEX: 22.82 KG/M2 | SYSTOLIC BLOOD PRESSURE: 118 MMHG | HEIGHT: 66 IN | WEIGHT: 142 LBS | DIASTOLIC BLOOD PRESSURE: 71 MMHG

## 2019-05-30 VITALS
DIASTOLIC BLOOD PRESSURE: 72 MMHG | HEIGHT: 66 IN | BODY MASS INDEX: 22.82 KG/M2 | WEIGHT: 142 LBS | SYSTOLIC BLOOD PRESSURE: 118 MMHG

## 2019-05-30 PROCEDURE — 99214 OFFICE O/P EST MOD 30 MIN: CPT

## 2019-05-30 PROCEDURE — ZZZZZ: CPT

## 2019-05-30 NOTE — REVIEW OF SYSTEMS
[Hand Weakness] :  hand weakness [Numbness] : numbness [Tingling] : tingling [Migraine Headache] : migraine headaches [Ataxia] : ataxia [As Noted in HPI] : as noted in HPI [Negative] : Heme/Lymph [FreeTextEntry3] : blurry vision, photophobia [FreeTextEntry4] : ringing in ears [FreeTextEntry9] : neck pain

## 2019-05-30 NOTE — HISTORY OF PRESENT ILLNESS
[FreeTextEntry1] : I had the pleasure of seeing Ms. Destiny Bailey for an initial visit in my office today. Ms. Bailey is a pleasant 54-year-old female who unfortunately has been suffering from headaches beginning at the back of head, which worsen when coughing or sneezing. Her headaches are associated with blurry vision, photophobia, ringing in her ears, neck pain, hand weakness as evidenced by her dropping of objects, left greater than right upper and lower extremity numbness and tingling radiating to all of her fingers and her smaller toes, and balance issues. Her symptoms have caused her to stop working as a . \par \par The patient was diagnosed with pseudotumor cerebri 5-6 years ago. The patient was found via a lumbar puncture to have an opening pressure of 22. The patient was also found via an MRV to have left transverse sinus stenosis. However, the patient was not found via Neuro-Ophthalmology to have papilledema. The patient has tried Diamox, with minimal to no relief.\par \par The patient was also diagnosed with Chiari 1 malformation this past September, with approximately 8mm descent of her cerebellar tonsils below the foramen magnum. The patient was evaluated by neurosurgeon Dr. Aaron Tipton and referred to my office for possible surgical intervention. \par \par The patient underwent a right frontal marie hole for insertion of an intracranial pressure monitor on 11/6/18. Her ICPs were between 1 and 8.\par \par The patient is now s/p suboccipital craniectomy and C1 laminectomy on 1/15/19. Postoperatively, the patient notes improvement in her preoperative headache, improvement in the ringing in her left ear, resolution of the ringing in her right ear, improvement in her neck pain, and improvement in her balance difficulty. The patient still notes residual blurry vision, photophobia, hand clumsiness, and bilateral upper and lower extremity paresthesias, especially after typing. Of note, the patient does have a history of bilateral carpal tunnel releases. Her incision has healed well.

## 2019-05-30 NOTE — REASON FOR VISIT
[de-identified] : suboccipital craniectomy and C1 laminectomy on 1/15/19 [de-identified] : 1/15/2019 [de-identified] : Chiari Malformation  [Other: _____] : [unfilled]

## 2019-05-30 NOTE — ASSESSMENT
[FreeTextEntry1] : The patient is a 54-year-old female s/p suboccipital craniectomy and C1 laminectomy on 1/15/19. I will order an EMG/nerve conduction study to further investigate the patient's hand clumsiness and paresthesias. The patient should follow up with me in 3 months for a 6-month postoperative visit.

## 2019-05-30 NOTE — PHYSICAL EXAM
[General Appearance - Alert] : alert [General Appearance - In No Acute Distress] : in no acute distress [Longitudinal] : longitudinal [Clean] : clean [Dry] : dry [Well-Healed] : well-healed [No Drainage] : without drainage [Normal Skin] : normal [Normal Skin Turgor] : skin turgor was normal [Oriented To Time, Place, And Person] : oriented to person, place, and time [Impaired Insight] : insight and judgment were intact [Affect] : the affect was normal [Person] : oriented to person [Place] : oriented to place [Time] : oriented to time [Short Term Intact] : short term memory intact [Remote Intact] : remote memory intact [Span Intact] : the attention span was normal [Concentration Intact] : normal concentrating ability [Fluency] : fluency intact [Comprehension] : comprehension intact [Current Events] : adequate knowledge of current events [Past History] : adequate knowledge of personal past history [Vocabulary] : adequate range of vocabulary [Cranial Nerves Optic (II)] : visual acuity intact bilaterally,  pupils equal round and reactive to light [Cranial Nerves Oculomotor (III)] : extraocular motion intact [Cranial Nerves Trigeminal (V)] : facial sensation intact symmetrically [Cranial Nerves Facial (VII)] : face symmetrical [Cranial Nerves Vestibulocochlear (VIII)] : hearing was intact bilaterally [Cranial Nerves Glossopharyngeal (IX)] : tongue and palate midline [Cranial Nerves Accessory (XI - Cranial And Spinal)] : head turning and shoulder shrug symmetric [Cranial Nerves Hypoglossal (XII)] : there was no tongue deviation with protrusion [Motor Strength] : muscle strength was normal in all four extremities [No Muscle Atrophy] : normal bulk in all four extremities [Sensation Tactile Decrease] : light touch was intact [Balance] : balance was intact [2+] : Patella left 2+ [No Visual Abnormalities] : no visible abnormailities [No Tenderness to Palpation] : no spine tenderness on palpation [Full ROM] : full ROM [No Pain with ROM] : no pain with motion in any direction [Normal] : normal [Intact] : all reflexes within normal limits bilaterally [Sclera] : the sclera and conjunctiva were normal [PERRL With Normal Accommodation] : pupils were equal in size, round, reactive to light, with normal accommodation [Extraocular Movements] : extraocular movements were intact [Outer Ear] : the ears and nose were normal in appearance [Oropharynx] : the oropharynx was normal [Neck Appearance] : the appearance of the neck was normal [Neck Cervical Mass (___cm)] : no neck mass was observed [Jugular Venous Distention Increased] : there was no jugular-venous distention [Thyroid Diffuse Enlargement] : the thyroid was not enlarged [Thyroid Nodule] : there were no palpable thyroid nodules [Auscultation Breath Sounds / Voice Sounds] : lungs were clear to auscultation bilaterally [Heart Rate And Rhythm] : heart rate was normal and rhythm regular [Heart Sounds] : normal S1 and S2 [Heart Sounds Gallop] : no gallops [Murmurs] : no murmurs [Heart Sounds Pericardial Friction Rub] : no pericardial rub [Full Pulse] : the pedal pulses are present [Edema] : there was no peripheral edema [Bowel Sounds] : normal bowel sounds [Abdomen Soft] : soft [Abdomen Tenderness] : non-tender [Abdomen Mass (___ Cm)] : no abdominal mass palpated [No CVA Tenderness] : no ~M costovertebral angle tenderness [No Spinal Tenderness] : no spinal tenderness [Abnormal Walk] : normal gait [Nail Clubbing] : no clubbing  or cyanosis of the fingernails [Musculoskeletal - Swelling] : no joint swelling seen [Motor Tone] : muscle strength and tone were normal [Skin Color & Pigmentation] : normal skin color and pigmentation [Skin Turgor] : normal skin turgor [] : no rash [Past-pointing] : there was no past-pointing [Tremor] : no tremor present [L'Hermitte's] : neck flexion did not produce tingling down the spine/arms [Spurling's - Opposite Side] : Negative Spurling's on opposite side [Spurling's Same Side] : Negative Spurling's on same side [Straight-Leg Raise Test - Left] : straight leg raise of the left leg was negative [Straight-Leg Raise Test - Right] : straight leg raise  of the right leg was negative

## 2019-06-20 ENCOUNTER — MEDICATION RENEWAL (OUTPATIENT)
Age: 54
End: 2019-06-20

## 2019-07-30 ENCOUNTER — OTHER (OUTPATIENT)
Age: 54
End: 2019-07-30

## 2019-08-02 ENCOUNTER — APPOINTMENT (OUTPATIENT)
Dept: MRI IMAGING | Facility: CLINIC | Age: 54
End: 2019-08-02
Payer: COMMERCIAL

## 2019-08-02 ENCOUNTER — TRANSCRIPTION ENCOUNTER (OUTPATIENT)
Age: 54
End: 2019-08-02

## 2019-08-02 ENCOUNTER — OUTPATIENT (OUTPATIENT)
Dept: OUTPATIENT SERVICES | Facility: HOSPITAL | Age: 54
LOS: 1 days | End: 2019-08-02
Payer: COMMERCIAL

## 2019-08-02 DIAGNOSIS — Z98.890 OTHER SPECIFIED POSTPROCEDURAL STATES: Chronic | ICD-10-CM

## 2019-08-02 DIAGNOSIS — Z90.89 ACQUIRED ABSENCE OF OTHER ORGANS: Chronic | ICD-10-CM

## 2019-08-02 DIAGNOSIS — Z00.8 ENCOUNTER FOR OTHER GENERAL EXAMINATION: ICD-10-CM

## 2019-08-02 DIAGNOSIS — L72.3 SEBACEOUS CYST: Chronic | ICD-10-CM

## 2019-08-02 DIAGNOSIS — Z90.49 ACQUIRED ABSENCE OF OTHER SPECIFIED PARTS OF DIGESTIVE TRACT: Chronic | ICD-10-CM

## 2019-08-02 PROCEDURE — 72141 MRI NECK SPINE W/O DYE: CPT | Mod: 26

## 2019-08-02 PROCEDURE — 72141 MRI NECK SPINE W/O DYE: CPT

## 2019-08-08 ENCOUNTER — APPOINTMENT (OUTPATIENT)
Dept: SPINE | Facility: CLINIC | Age: 54
End: 2019-08-08
Payer: COMMERCIAL

## 2019-08-08 VITALS
BODY MASS INDEX: 22.82 KG/M2 | SYSTOLIC BLOOD PRESSURE: 111 MMHG | DIASTOLIC BLOOD PRESSURE: 70 MMHG | WEIGHT: 142 LBS | HEIGHT: 66 IN

## 2019-08-08 DIAGNOSIS — Z72.89 OTHER PROBLEMS RELATED TO LIFESTYLE: ICD-10-CM

## 2019-08-08 PROCEDURE — 99214 OFFICE O/P EST MOD 30 MIN: CPT

## 2019-08-08 NOTE — ASSESSMENT
[FreeTextEntry1] : The patient is a 54-year-old female with Chiari 1 malformation /p suboccipital craniectomy and C1 laminectomy on 1/15/19 and craniovertebral instability. I will order a rigid cervical collar for the patient to trial, in order to assess whether or not the patient may benefit from immobilization of her craniocervical junction. The importance of smoking cessation prior to any possible posterior craniocervical instrumentation and fusion was discussed, especially in terms of bone healing and bone formation. The patient expressed understanding and is willing to consider smoking cessation. The patient will follow up with me after her rigid cervical collar trial for further discussion.

## 2019-08-08 NOTE — PHYSICAL EXAM
[General Appearance - Alert] : alert [Longitudinal] : longitudinal [General Appearance - In No Acute Distress] : in no acute distress [Clean] : clean [Dry] : dry [Well-Healed] : well-healed [No Drainage] : without drainage [Normal Skin] : normal [Normal Skin Turgor] : skin turgor was normal [Oriented To Time, Place, And Person] : oriented to person, place, and time [Impaired Insight] : insight and judgment were intact [Affect] : the affect was normal [Person] : oriented to person [Place] : oriented to place [Time] : oriented to time [Short Term Intact] : short term memory intact [Remote Intact] : remote memory intact [Span Intact] : the attention span was normal [Concentration Intact] : normal concentrating ability [Comprehension] : comprehension intact [Fluency] : fluency intact [Current Events] : adequate knowledge of current events [Past History] : adequate knowledge of personal past history [Vocabulary] : adequate range of vocabulary [Cranial Nerves Optic (II)] : visual acuity intact bilaterally,  pupils equal round and reactive to light [Cranial Nerves Oculomotor (III)] : extraocular motion intact [Cranial Nerves Trigeminal (V)] : facial sensation intact symmetrically [Cranial Nerves Facial (VII)] : face symmetrical [Cranial Nerves Vestibulocochlear (VIII)] : hearing was intact bilaterally [Cranial Nerves Glossopharyngeal (IX)] : tongue and palate midline [Cranial Nerves Accessory (XI - Cranial And Spinal)] : head turning and shoulder shrug symmetric [Cranial Nerves Hypoglossal (XII)] : there was no tongue deviation with protrusion [Motor Strength] : muscle strength was normal in all four extremities [No Muscle Atrophy] : normal bulk in all four extremities [Sensation Tactile Decrease] : light touch was intact [Balance] : balance was intact [Past-pointing] : there was no past-pointing [Tremor] : no tremor present [2+] : Patella right 2+ [Spurling's - Opposite Side] : Negative Spurling's on opposite side [L'Hermitte's] : neck flexion did not produce tingling down the spine/arms [Spurling's Same Side] : Negative Spurling's on same side [No Visual Abnormalities] : no visible abnormailities [Full ROM] : full ROM [No Pain with ROM] : no pain with motion in any direction [No Tenderness to Palpation] : no spine tenderness on palpation [Straight-Leg Raise Test - Left] : straight leg raise of the left leg was negative [Straight-Leg Raise Test - Right] : straight leg raise  of the right leg was negative [Normal] : normal [Sclera] : the sclera and conjunctiva were normal [Intact] : all reflexes within normal limits bilaterally [PERRL With Normal Accommodation] : pupils were equal in size, round, reactive to light, with normal accommodation [Extraocular Movements] : extraocular movements were intact [Outer Ear] : the ears and nose were normal in appearance [Oropharynx] : the oropharynx was normal [Neck Appearance] : the appearance of the neck was normal [Neck Cervical Mass (___cm)] : no neck mass was observed [Jugular Venous Distention Increased] : there was no jugular-venous distention [Thyroid Nodule] : there were no palpable thyroid nodules [Thyroid Diffuse Enlargement] : the thyroid was not enlarged [Auscultation Breath Sounds / Voice Sounds] : lungs were clear to auscultation bilaterally [Heart Rate And Rhythm] : heart rate was normal and rhythm regular [Heart Sounds] : normal S1 and S2 [Heart Sounds Gallop] : no gallops [Murmurs] : no murmurs [Full Pulse] : the pedal pulses are present [Heart Sounds Pericardial Friction Rub] : no pericardial rub [Bowel Sounds] : normal bowel sounds [Edema] : there was no peripheral edema [Abdomen Soft] : soft [Abdomen Tenderness] : non-tender [Abdomen Mass (___ Cm)] : no abdominal mass palpated [No CVA Tenderness] : no ~M costovertebral angle tenderness [No Spinal Tenderness] : no spinal tenderness [Abnormal Walk] : normal gait [Nail Clubbing] : no clubbing  or cyanosis of the fingernails [Musculoskeletal - Swelling] : no joint swelling seen [Motor Tone] : muscle strength and tone were normal [Skin Turgor] : normal skin turgor [Skin Color & Pigmentation] : normal skin color and pigmentation [] : no rash

## 2019-08-08 NOTE — HISTORY OF PRESENT ILLNESS
[FreeTextEntry1] : I had the pleasure of seeing Ms. Destiny Bailey for an initial visit in my office today. Ms. Bailey is a pleasant 54-year-old female who unfortunately has been suffering from headaches beginning at the back of head, which worsen when coughing or sneezing. Her headaches are associated with blurry vision, photophobia, ringing in her ears, neck pain, hand weakness as evidenced by her dropping of objects, left greater than right upper and lower extremity numbness and tingling radiating to all of her fingers and her smaller toes, and balance issues. Her symptoms have caused her to stop working as a . \par \par The patient was diagnosed with pseudotumor cerebri 5-6 years ago. The patient was found via a lumbar puncture to have an opening pressure of 22. The patient was also found via an MRV to have left transverse sinus stenosis. However, the patient was not found via Neuro-Ophthalmology to have papilledema. The patient has tried Diamox, with minimal to no relief.\par \par The patient was also diagnosed with Chiari 1 malformation this past September, with approximately 8mm descent of her cerebellar tonsils below the foramen magnum. The patient was evaluated by neurosurgeon Dr. Aaron Tipton and referred to my office for possible surgical intervention. \par \par The patient underwent a right frontal marie hole for insertion of an intracranial pressure monitor on 11/6/18. Her ICPs were between 1 and 8.\par \par The patient is now s/p suboccipital craniectomy and C1 laminectomy on 1/15/19. Postoperatively, the patient initially noted improvement in her preoperative symptoms. However, over the past couple of months, the patient has noted a return of her debilitating headache, hearing difficulty, and imbalance. The patient still notes improvement in her head weakness, dropping of objects, and bilateral upper and lower extremity paresthesias. Her incision has healed well.

## 2019-08-08 NOTE — DATA REVIEWED
[de-identified] : Flexion and extension MRI of cervical spine from [facility] 8/2/2019: clivo-axial angle of 142 degrees in neutral, 136 degrees in flexion, and 153 degrees in extension (normal 150-170 degrees, pathologic <135-140 degrees)

## 2019-08-08 NOTE — REVIEW OF SYSTEMS
[Feeling Tired] : feeling tired [Decr. Concentrating Ability] : decreased concentrating ability [Hand Weakness] :  hand weakness [Memory Lapses or Loss] : memory loss [Tingling] : tingling [Numbness] : numbness [Dizziness] : dizziness [Lightheadedness] : lightheadedness [Migraine Headache] : migraine headaches [Negative] : Heme/Lymph [As Noted in HPI] : as noted in HPI [Ataxia] : ataxia [Loss Of Hearing] : hearing loss [de-identified] : tinnitus, photophobia, balance disturbances, neck pain  [FreeTextEntry4] : eye pressure  [FreeTextEntry1] : No bowel or bladder symptoms

## 2019-08-29 ENCOUNTER — APPOINTMENT (OUTPATIENT)
Dept: SPINE | Facility: CLINIC | Age: 54
End: 2019-08-29

## 2019-12-17 ENCOUNTER — RX RENEWAL (OUTPATIENT)
Age: 54
End: 2019-12-17

## 2020-01-29 ENCOUNTER — APPOINTMENT (OUTPATIENT)
Dept: NEUROSURGERY | Facility: CLINIC | Age: 55
End: 2020-01-29
Payer: COMMERCIAL

## 2020-01-29 VITALS
SYSTOLIC BLOOD PRESSURE: 101 MMHG | TEMPERATURE: 97.5 F | OXYGEN SATURATION: 100 % | HEIGHT: 66 IN | BODY MASS INDEX: 22.82 KG/M2 | WEIGHT: 142 LBS | DIASTOLIC BLOOD PRESSURE: 70 MMHG | RESPIRATION RATE: 16 BRPM | HEART RATE: 91 BPM

## 2020-01-29 DIAGNOSIS — Z83.518 FAMILY HISTORY OF OTHER SPECIFIED EYE DISORDER: ICD-10-CM

## 2020-01-29 DIAGNOSIS — Z83.3 FAMILY HISTORY OF DIABETES MELLITUS: ICD-10-CM

## 2020-01-29 DIAGNOSIS — Z82.49 FAMILY HISTORY OF ISCHEMIC HEART DISEASE AND OTHER DISEASES OF THE CIRCULATORY SYSTEM: ICD-10-CM

## 2020-01-29 DIAGNOSIS — Z86.69 PERSONAL HISTORY OF OTHER DISEASES OF THE NERVOUS SYSTEM AND SENSE ORGANS: ICD-10-CM

## 2020-01-29 PROCEDURE — 99215 OFFICE O/P EST HI 40 MIN: CPT

## 2020-01-29 RX ORDER — IBUPROFEN 800 MG
TABLET ORAL
Refills: 0 | Status: ACTIVE | COMMUNITY

## 2020-01-29 NOTE — REASON FOR VISIT
[New Patient Visit] : a new patient visit [Referred By: _________] : Patient was referred by SYMONE [Spouse] : spouse

## 2020-01-29 NOTE — PHYSICAL EXAM
[General Appearance - Alert] : alert [General Appearance - Well Nourished] : well nourished [General Appearance - In No Acute Distress] : in no acute distress [General Appearance - Well Developed] : well developed [General Appearance - Well-Appearing] : healthy appearing [Oriented To Time, Place, And Person] : oriented to person, place, and time [Impaired Insight] : insight and judgment were intact [Affect] : the affect was normal [Memory Recent] : recent memory was not impaired [Person] : oriented to person [Time] : oriented to time [Place] : oriented to place [Cranial Nerves Optic (II)] : visual acuity intact bilaterally,  pupils equal round and reactive to light [Cranial Nerves Oculomotor (III)] : extraocular motion intact [Cranial Nerves Trigeminal (V)] : facial sensation intact symmetrically [Cranial Nerves Facial (VII)] : face symmetrical [Cranial Nerves Glossopharyngeal (IX)] : tongue and palate midline [Cranial Nerves Vestibulocochlear (VIII)] : hearing was intact bilaterally [Cranial Nerves Accessory (XI - Cranial And Spinal)] : head turning and shoulder shrug symmetric [Motor Strength] : muscle strength was normal in all four extremities [Cranial Nerves Hypoglossal (XII)] : there was no tongue deviation with protrusion [Motor Handedness Right-Handed] : the patient is right hand dominant [Sclera] : the sclera and conjunctiva were normal [PERRL With Normal Accommodation] : pupils were equal in size, round, reactive to light, with normal accommodation [Extraocular Movements] : extraocular movements were intact [Outer Ear] : the ears and nose were normal in appearance [Oropharynx] : the oropharynx was normal [Hearing Threshold Finger Rub Not [place]] : hearing was normal [Neck Appearance] : the appearance of the neck was normal [Respiration, Rhythm And Depth] : normal respiratory rhythm and effort [Exaggerated Use Of Accessory Muscles For Inspiration] : no accessory muscle use [Heart Rate And Rhythm] : heart rate was normal and rhythm regular [Abnormal Walk] : normal gait [Involuntary Movements] : no involuntary movements were seen [Skin Color & Pigmentation] : normal skin color and pigmentation [Motor Tone] : muscle strength and tone were normal [] : no rash

## 2020-01-30 NOTE — ASSESSMENT
[FreeTextEntry1] : IMPRESSION:\par 1. Clivo-[place]al angle 147 degrees.\par \par PLAN:\par 1. Updated neuro ophthalmologic exam.\par 2. PT sessions x 3 months to see if symptoms decrease.\par 3. F/U in 3 months after PT sessions.

## 2020-01-30 NOTE — HISTORY OF PRESENT ILLNESS
[> 3 months] : more  than 3 months [FreeTextEntry1] : "Chiar malformation" [de-identified] : NORMA CASTANEDA is a right handed 54 year old lady who presents for consultation regarding "neck and shoulder pain, burning pain between shoulder blades, intermittent numbness in both hands, near fainting spells, tinnitus L > R, headaches, tingling in both arms, legs, right face".  Per patient she was diagnosed with idiopathic intracranial hypertension 5 or 6 years ago by a physician luis fernando but she states she did not get along with that MD and stopped seeing him. \par \par The patient was then found to have Chiari malformation upon further workup in September and underwent a Suboccipital craniectomy,  C1 laminectomy on 1/15/19 by Dr. Collier.  States that she stayed home for about 6 months and had a relatively symptom free period however the above mentioned symptoms have returned since early summer 2019 or late Spring 2019. She states that symptoms returned when she started working again.  Per patient she Dr. Chamberlain recommended that she should have an EMG done however she has not done so at this point.  She also admits that she continue to smoke after her SOC surgery\par

## 2020-01-30 NOTE — DATA REVIEWED
[de-identified] : EXAM: MR SPINE CERVICAL \par \par \par PROCEDURE DATE: 08/02/2019 \par \par \par \par INTERPRETATION: \par CERVICAL SPINE MRI \par \par CLINICAL INFORMATION: Chronic neck pain and headaches. History of prior \par Chiari malformation surgery in January 2019. \par TECHNIQUE: Multiplanar, multisequence MRI was obtained of the cervical \par spine. Additional lateral extension and flexion sequences were obtained. \par COMPARISON: None available. \par \par FINDINGS: \par \par ALIGNMENT: The cervical lordosis is maintained. No spondylolisthesis in the \par neutral, flexion, or extension positions. \par MARROW: Vertebral body heights are preserved. Patient status post \par suboccipital decompression. No MR evidence of acute postsurgical \par complication. \par CORD: Cord is normal in caliber and signal characteristics. No syrinx is \par identified. \par DISC SPACES: Intervertebral disc heights are maintained. \par IMAGED BRAIN: Craniocervical junction is mildly crowded, consistent with \par reported history of Chiari malformation. No narrowing of the craniocervical \par junction and the neutral, flexion, or extension positions. \par PERIPHERAL/NECK SOFT TISSUES: Within normal limits. \par \par DISC LEVEL EVALUATION: \par \par C2/C3: No central stenosis or neural foraminal narrowing. \par C3/C4: Mild left-sided facet arthropathy. No central stenosis or neural \par foraminal narrowing. \par C4/C5: No central stenosis or neural foraminal narrowing. \par C5/C6: No central stenosis or neural foraminal narrowing. \par C6/C7: No central stenosis or neural foraminal narrowing. \par C7/T1: No central stenosis or neural foraminal narrowing. \par \par \par IMPRESSION: \par 1. Patient status post suboccipital decompression. No evidence of \par postsurgical complication. \par 2. No central stenosis or neural foraminal narrowing. \par \par \par \par \par \par \par \par \par TATI LEWIS M.D., ATTENDING RADIOLOGIST \par This document has been electronically signed. Aug 5 2019 2:21PM \par \par

## 2020-01-30 NOTE — REVIEW OF SYSTEMS
[Tingling] : tingling [Dizziness] : dizziness [Eyesight Problems] : eyesight problems [Palpitations] : palpitations [Negative] : Endocrine [FreeTextEntry2] : fatigue, weakness [de-identified] : headaches, motor changes [FreeTextEntry3] : intolerance to light, vision changes, blurry vision [FreeTextEntry4] : ringing in ears [FreeTextEntry7] : nausea and voimiging [FreeTextEntry9] : weakness, myalgias

## 2020-08-14 ENCOUNTER — APPOINTMENT (OUTPATIENT)
Dept: NEUROSURGERY | Facility: CLINIC | Age: 55
End: 2020-08-14
Payer: COMMERCIAL

## 2020-08-14 VITALS
RESPIRATION RATE: 16 BRPM | BODY MASS INDEX: 22.82 KG/M2 | WEIGHT: 142 LBS | SYSTOLIC BLOOD PRESSURE: 105 MMHG | OXYGEN SATURATION: 97 % | TEMPERATURE: 98.5 F | HEIGHT: 66 IN | HEART RATE: 94 BPM | DIASTOLIC BLOOD PRESSURE: 71 MMHG

## 2020-08-14 DIAGNOSIS — F17.200 NICOTINE DEPENDENCE, UNSPECIFIED, UNCOMPLICATED: ICD-10-CM

## 2020-08-14 DIAGNOSIS — Z56.0 UNEMPLOYMENT, UNSPECIFIED: ICD-10-CM

## 2020-08-14 PROCEDURE — 99215 OFFICE O/P EST HI 40 MIN: CPT

## 2020-08-14 RX ORDER — DIAZEPAM 5 MG/1
5 TABLET ORAL
Qty: 14 | Refills: 0 | Status: DISCONTINUED | COMMUNITY
Start: 2019-06-20 | End: 2020-08-14

## 2020-08-14 SDOH — ECONOMIC STABILITY - INCOME SECURITY: UNEMPLOYMENT, UNSPECIFIED: Z56.0

## 2020-08-14 NOTE — PHYSICAL EXAM
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 [Affect] : the affect was normal [Memory Recent] : recent memory was not impaired [Person] : oriented to person [Place] : oriented to place [Time] : oriented to time [Cranial Nerves Oculomotor (III)] : extraocular motion intact [Cranial Nerves Optic (II)] : visual acuity intact bilaterally,  pupils equal round and reactive to light [Cranial Nerves Facial (VII)] : face symmetrical [Cranial Nerves Vestibulocochlear (VIII)] : hearing was intact bilaterally [Cranial Nerves Trigeminal (V)] : facial sensation intact symmetrically [Cranial Nerves Accessory (XI - Cranial And Spinal)] : head turning and shoulder shrug symmetric [Cranial Nerves Glossopharyngeal (IX)] : tongue and palate midline [Cranial Nerves Hypoglossal (XII)] : there was no tongue deviation with protrusion [PERRL With Normal Accommodation] : pupils were equal in size, round, reactive to light, with normal accommodation [Sclera] : the sclera and conjunctiva were normal [Extraocular Movements] : extraocular movements were intact [Hearing Threshold Finger Rub Not [place]] : hearing was normal [Outer Ear] : the ears and nose were normal in appearance [Neck Appearance] : the appearance of the neck was normal [Oropharynx] : the oropharynx was normal [Exaggerated Use Of Accessory Muscles For Inspiration] : no accessory muscle use [Heart Rate And Rhythm] : heart rate was normal and rhythm regular [Involuntary Movements] : no involuntary movements were seen [Abnormal Walk] : normal gait [Motor Tone] : muscle strength and tone were normal [] : no rash [Skin Color & Pigmentation] : normal skin color and pigmentation

## 2020-08-24 NOTE — ASSESSMENT
[FreeTextEntry1] : IMPRESSION:\par 1. Chirari 1 malformation with previous Suboccipital craniectomy.\par  C1 laminectomy on 1/15/2019 by Dr. Chamberlain.\par \par PLAN:\par 1. MRI brain - non contrast.\par 2. MRI cervical spine non contrast with flexion and extension.\par

## 2020-08-24 NOTE — REASON FOR VISIT
[Follow-Up: _____] : a [unfilled] follow-up visit [Other: _____] : [unfilled] [FreeTextEntry1] : To review, NORMA CASTANEDA is a right handed 55 year old lady who presented on 1/29/20 for consultation

## 2020-08-24 NOTE — HISTORY OF PRESENT ILLNESS
[FreeTextEntry1] : regarding "neck and shoulder pain, burning pain between shoulder blades, intermittent numbness in both hands, near fainting spells, tinnitus L > R, headaches, tingling in both arms, legs, right face".   The patient was then found to have Chiari malformation upon further workup in September and underwent a Suboccipital craniectomy, C1 laminectomy on 1/15/19 by Dr. Collier. States that she stayed home for about 6 months and had a relatively symptom free period however the above mentioned symptoms have returned since early summer 2019 or late Spring 2019. She states that symptoms returned when she started working again. Per patient she Dr. Chamberlain recommended that she should have an EMG done however she has not done so at this point. She also admits that she continue to smoke after her SOC surgery  Todays she reports symptoms of dizziness when bending, pressure in back of head, neck and shoulder, hips hurt, nausea, tingling in arms and legs, tingling in face, low heat tolerance, headaches on a daily basis 3-4/10 not relieved with analgesics such as Motrin and Tylenol.  She has stopped working due to symptoms.  She had a neuro opthalmology exam and PT sessions for 3 months without any improvement in her symptoms.  She is here today for surgical consideration.

## 2020-08-24 NOTE — REVIEW OF SYSTEMS
[Dizziness] : dizziness [Negative] : Heme/Lymph [FreeTextEntry2] : weakness [de-identified] : weakness, headaches

## 2020-09-28 ENCOUNTER — APPOINTMENT (OUTPATIENT)
Dept: NEUROLOGY | Facility: CLINIC | Age: 55
End: 2020-09-28
Payer: COMMERCIAL

## 2020-09-28 VITALS
SYSTOLIC BLOOD PRESSURE: 101 MMHG | DIASTOLIC BLOOD PRESSURE: 71 MMHG | HEART RATE: 92 BPM | BODY MASS INDEX: 22.82 KG/M2 | TEMPERATURE: 98.5 F | WEIGHT: 142 LBS | HEIGHT: 66 IN

## 2020-09-28 DIAGNOSIS — G93.2 BENIGN INTRACRANIAL HYPERTENSION: ICD-10-CM

## 2020-09-28 PROCEDURE — 99204 OFFICE O/P NEW MOD 45 MIN: CPT

## 2020-09-28 NOTE — DISCUSSION/SUMMARY
[FreeTextEntry1] : She is a 55-year-old patient with a history of Chiari malformation status post surgery suboccipital craniectomy with C1 laminectomy with persistent symptoms of headaches, dizziness, nausea, bilateral upper extremity paresthesias numbness.\par Recommend patient to have repeat MRI of the cervical spine and brain pending.\par Consider use of either Topamax or gabapentin or persistent headaches and dizziness and chronic pain.\par Restart physical therapy which patient recently started.\par Keep a headache log.\par Recommend EEG for brain fog. \par Recommend rheumatology evaluation for Ehler-Danlos  syndrome.\par Followup evaluation of the workup is completed.\par Patient education provided and discussed with the patient.\par Followup with you further medical problems.\par \par \par

## 2020-09-28 NOTE — CONSULT LETTER
[Dear  ___] : Dear  [unfilled], [Consult Letter:] : I had the pleasure of evaluating your patient, [unfilled]. [Please see my note below.] : Please see my note below. [Consult Closing:] : Thank you very much for allowing me to participate in the care of this patient.  If you have any questions, please do not hesitate to contact me. [Sincerely,] : Sincerely, [DrBronwyn  ___] : Dr. ASHBY

## 2020-09-28 NOTE — REVIEW OF SYSTEMS
[Numbness] : numbness [Tingling] : tingling [Hypersensitivity] : hypersensitivity [Dizziness] : dizziness [Migraine Headache] : migraine headaches [Tension Headache] : tension-type headaches [Arthralgias] : arthralgias [Joint Pain] : joint pain [Negative] : Heme/Lymph [de-identified] : nausea

## 2020-09-28 NOTE — PHYSICAL EXAM
[General Appearance - Alert] : alert [General Appearance - In No Acute Distress] : in no acute distress [Oriented To Time, Place, And Person] : oriented to person, place, and time [Impaired Insight] : insight and judgment were intact [Affect] : the affect was normal [Person] : oriented to person [Place] : oriented to place [Time] : oriented to time [Concentration Intact] : normal concentrating ability [Visual Intact] : visual attention was ~T not ~L decreased [Naming Objects] : no difficulty naming common objects [Repeating Phrases] : no difficulty repeating a phrase [Writing A Sentence] : no difficulty writing a sentence [Fluency] : fluency intact [Comprehension] : comprehension intact [Reading] : reading intact [Past History] : adequate knowledge of personal past history [Cranial Nerves Optic (II)] : visual acuity intact bilaterally,  visual fields full to confrontation, pupils equal round and reactive to light [Cranial Nerves Oculomotor (III)] : extraocular motion intact [Cranial Nerves Trigeminal (V)] : facial sensation intact symmetrically [Cranial Nerves Facial (VII)] : face symmetrical [Cranial Nerves Vestibulocochlear (VIII)] : hearing was intact bilaterally [Cranial Nerves Glossopharyngeal (IX)] : tongue and palate midline [Cranial Nerves Accessory (XI - Cranial And Spinal)] : head turning and shoulder shrug symmetric [Cranial Nerves Hypoglossal (XII)] : there was no tongue deviation with protrusion [Motor Strength] : muscle strength was normal in all four extremities [No Muscle Atrophy] : normal bulk in all four extremities [Sensation Tactile Decrease] : light touch was intact [Balance] : balance was intact [Past-pointing] : there was no past-pointing [Tremor] : no tremor present [2+] : Brachioradialis left 2+ [1+] : Ankle jerk left 1+ [Plantar Reflex Right Only] : normal on the right [Plantar Reflex Left Only] : normal on the left [Sclera] : the sclera and conjunctiva were normal [PERRL With Normal Accommodation] : pupils were equal in size, round, reactive to light, with normal accommodation [Extraocular Movements] : extraocular movements were intact [Outer Ear] : the ears and nose were normal in appearance [Oropharynx] : the oropharynx was normal [Neck Appearance] : the appearance of the neck was normal [Neck Cervical Mass (___cm)] : no neck mass was observed [Jugular Venous Distention Increased] : there was no jugular-venous distention [Thyroid Diffuse Enlargement] : the thyroid was not enlarged [Thyroid Nodule] : there were no palpable thyroid nodules [Auscultation Breath Sounds / Voice Sounds] : lungs were clear to auscultation bilaterally [Heart Rate And Rhythm] : heart rate was normal and rhythm regular [Heart Sounds] : normal S1 and S2 [Heart Sounds Gallop] : no gallops [Murmurs] : no murmurs [Heart Sounds Pericardial Friction Rub] : no pericardial rub [Full Pulse] : the pedal pulses are present [Edema] : there was no peripheral edema [Bowel Sounds] : normal bowel sounds [Abdomen Soft] : soft [Abdomen Tenderness] : non-tender [Abdomen Mass (___ Cm)] : no abdominal mass palpated [No CVA Tenderness] : no ~M costovertebral angle tenderness [No Spinal Tenderness] : no spinal tenderness [Abnormal Walk] : normal gait [Nail Clubbing] : no clubbing  or cyanosis of the fingernails [Musculoskeletal - Swelling] : no joint swelling seen [Motor Tone] : muscle strength and tone were normal [FreeTextEntry1] : Lax joints Ehler- danlos syndrome [Skin Color & Pigmentation] : normal skin color and pigmentation [Skin Turgor] : normal skin turgor [] : no rash

## 2020-09-28 NOTE — HISTORY OF PRESENT ILLNESS
[FreeTextEntry1] : She is 55-year-old patient here for evaluation for persistent symptoms after a Chiari malformation1 status post suboccipital  craniectomy in 2019 and C1 laminectomy with persistent symptoms of headaches, dizziness, bilateral upper extremity paresthesias and pain involving both shoulders and burning sensation between the shoulder blades postoperatively with the nausea with the dizziness.\par Patient had similar symptoms worse headaches her headaches improved somewhat but rest of the symptoms are persisting. Patient also diagnosed to have pseudotumor cerebri in the past not subsequently. Prior to surgery patient had workup.\par Symptoms abated initially but continued to have symptoms of dizziness and pleasure behind the neck and head in between the shoulder blades with a tingling sensation intermittently with the dizziness and nauseousness continuously.\par No focal weakness tried physical therapy which improved her symptoms but unable to continue due to COVID pandemic.\par Seen by neurosurgery recently as a followup recommended repeat MRI brain and cervical spine which is pending. Medications given tried on the muscle relaxers was not helping.

## 2020-09-28 NOTE — REASON FOR VISIT
[Consultation] : a consultation visit [FreeTextEntry1] : Evaluation for Pt with Chiari Malformation s/p Surgery with Persistent Headaches and Dizziness / Naesea

## 2020-10-06 ENCOUNTER — APPOINTMENT (OUTPATIENT)
Dept: MRI IMAGING | Facility: CLINIC | Age: 55
End: 2020-10-06
Payer: COMMERCIAL

## 2020-10-06 ENCOUNTER — OUTPATIENT (OUTPATIENT)
Dept: OUTPATIENT SERVICES | Facility: HOSPITAL | Age: 55
LOS: 1 days | End: 2020-10-06
Payer: COMMERCIAL

## 2020-10-06 DIAGNOSIS — Z90.89 ACQUIRED ABSENCE OF OTHER ORGANS: Chronic | ICD-10-CM

## 2020-10-06 DIAGNOSIS — Z98.890 OTHER SPECIFIED POSTPROCEDURAL STATES: Chronic | ICD-10-CM

## 2020-10-06 DIAGNOSIS — L72.3 SEBACEOUS CYST: Chronic | ICD-10-CM

## 2020-10-06 DIAGNOSIS — G93.5 COMPRESSION OF BRAIN: ICD-10-CM

## 2020-10-06 DIAGNOSIS — Z90.49 ACQUIRED ABSENCE OF OTHER SPECIFIED PARTS OF DIGESTIVE TRACT: Chronic | ICD-10-CM

## 2020-10-06 PROCEDURE — 70551 MRI BRAIN STEM W/O DYE: CPT | Mod: 26

## 2020-10-06 PROCEDURE — 72141 MRI NECK SPINE W/O DYE: CPT | Mod: 26

## 2020-10-06 PROCEDURE — 72141 MRI NECK SPINE W/O DYE: CPT

## 2020-10-06 PROCEDURE — 70551 MRI BRAIN STEM W/O DYE: CPT

## 2020-10-13 ENCOUNTER — APPOINTMENT (OUTPATIENT)
Dept: NEUROLOGY | Facility: CLINIC | Age: 55
End: 2020-10-13
Payer: COMMERCIAL

## 2020-10-13 VITALS
TEMPERATURE: 98 F | HEART RATE: 94 BPM | SYSTOLIC BLOOD PRESSURE: 118 MMHG | DIASTOLIC BLOOD PRESSURE: 70 MMHG | WEIGHT: 140 LBS | BODY MASS INDEX: 22.5 KG/M2 | HEIGHT: 66 IN

## 2020-10-13 PROCEDURE — 99214 OFFICE O/P EST MOD 30 MIN: CPT

## 2020-10-13 NOTE — REVIEW OF SYSTEMS
[Numbness] : numbness [Tingling] : tingling [Hypersensitivity] : hypersensitivity [Dizziness] : dizziness [Migraine Headache] : migraine headaches [Tension Headache] : tension-type headaches [Arthralgias] : arthralgias [Joint Pain] : joint pain [Negative] : Heme/Lymph [de-identified] : nausea

## 2020-10-13 NOTE — DATA REVIEWED
[de-identified] : MRI  Brain RT frontal buu hole with gliosis/hemosiderin along the catheter \par low lying Rt cerebellar tonsil measuring 5 mm below level of foramen magnum unchanged compared to prior imaging\par MRI c  spine No acute patholony no intrinsic  cord abnormality like syrinx

## 2020-10-13 NOTE — DISCUSSION/SUMMARY
[FreeTextEntry1] : She is 55-year-old patient coming here for proper evaluation for Chiari malformation status post surgery with decompression persistent headaches dizziness and upper cervical muscle spasm with upper extremity paresthesias.\par MRI scan of the brain and cervical spine did not reveal any new pathology persistent 5 mm low-lying tonsils  right cerebellar tonsil unchanged from prior scan.\par EEG was not done .\par Recommend starting patient on gabapentin 100 mg at bedtime titrate the dose gradually to 200 mg slowly and gradually.\par Trial with cyclobenzaprine 10 mg p.r.n. for muscle spasms in the neck.\par Waiting for rheumatology consult and neurosurgery followup.\par Patient education provided discussed with the patient.\par Returns for followup evaluation 2-3 months or as needed.\par Continue physical therapy.\par Currently patient states she cannot return to work. Due to severe pain and dizziness.\par

## 2020-10-13 NOTE — REASON FOR VISIT
[Follow-Up: _____] : a [unfilled] follow-up visit [FreeTextEntry1] : Follow up for Pt with Chiari Malformation and S/p Decompression surgery and Ch Headaches with Dizziness and Cervical muscle spasms

## 2020-10-19 ENCOUNTER — APPOINTMENT (OUTPATIENT)
Dept: NEUROLOGY | Facility: CLINIC | Age: 55
End: 2020-10-19
Payer: COMMERCIAL

## 2020-10-19 DIAGNOSIS — F48.8 OTHER SPECIFIED NONPSYCHOTIC MENTAL DISORDERS: ICD-10-CM

## 2020-10-19 PROCEDURE — 95816 EEG AWAKE AND DROWSY: CPT

## 2020-10-19 PROCEDURE — 99072 ADDL SUPL MATRL&STAF TM PHE: CPT

## 2020-10-23 ENCOUNTER — APPOINTMENT (OUTPATIENT)
Dept: NEUROSURGERY | Facility: CLINIC | Age: 55
End: 2020-10-23
Payer: COMMERCIAL

## 2020-10-23 VITALS
BODY MASS INDEX: 22.5 KG/M2 | RESPIRATION RATE: 17 BRPM | HEART RATE: 90 BPM | OXYGEN SATURATION: 99 % | DIASTOLIC BLOOD PRESSURE: 75 MMHG | TEMPERATURE: 98.7 F | HEIGHT: 66 IN | WEIGHT: 140 LBS | SYSTOLIC BLOOD PRESSURE: 110 MMHG

## 2020-10-23 PROCEDURE — 99215 OFFICE O/P EST HI 40 MIN: CPT

## 2020-10-23 PROCEDURE — 99072 ADDL SUPL MATRL&STAF TM PHE: CPT

## 2020-10-28 NOTE — PHYSICAL EXAM
[General Appearance - Alert] : alert [General Appearance - In No Acute Distress] : in no acute distress [General Appearance - Well Nourished] : well nourished [General Appearance - Well Developed] : well developed [General Appearance - Well-Appearing] : healthy appearing [Oriented To Time, Place, And Person] : oriented to person, place, and time [Impaired Insight] : insight and judgment were intact [Affect] : the affect was normal [Memory Recent] : recent memory was not impaired [Person] : oriented to person [Place] : oriented to place [Time] : oriented to time [Cranial Nerves Optic (II)] : visual acuity intact bilaterally,  pupils equal round and reactive to light [Cranial Nerves Oculomotor (III)] : extraocular motion intact [Cranial Nerves Trigeminal (V)] : facial sensation intact symmetrically [Cranial Nerves Facial (VII)] : face symmetrical [Cranial Nerves Vestibulocochlear (VIII)] : hearing was intact bilaterally [Cranial Nerves Glossopharyngeal (IX)] : tongue and palate midline [Cranial Nerves Accessory (XI - Cranial And Spinal)] : head turning and shoulder shrug symmetric [Cranial Nerves Hypoglossal (XII)] : there was no tongue deviation with protrusion [Sclera] : the sclera and conjunctiva were normal [PERRL With Normal Accommodation] : pupils were equal in size, round, reactive to light, with normal accommodation [Extraocular Movements] : extraocular movements were intact [Outer Ear] : the ears and nose were normal in appearance [Hearing Threshold Finger Rub Not [place]] : hearing was normal [Oropharynx] : the oropharynx was normal [Neck Appearance] : the appearance of the neck was normal [Respiration, Rhythm And Depth] : normal respiratory rhythm and effort [Exaggerated Use Of Accessory Muscles For Inspiration] : no accessory muscle use [Heart Rate And Rhythm] : heart rate was normal and rhythm regular [Abnormal Walk] : normal gait [Involuntary Movements] : no involuntary movements were seen [Motor Tone] : muscle strength and tone were normal [Skin Color & Pigmentation] : normal skin color and pigmentation [] : no rash

## 2020-11-06 NOTE — DATA REVIEWED
[de-identified] : EXAM: MR SPINE CERVICAL \par \par EXAM: MR BRAIN \par \par \par PROCEDURE DATE: 10/06/2020 \par \par \par \par INTERPRETATION: CLINICAL INDICATION: Chiari I malformation. \par \par TECHNIQUE: Multi-planar multi-sequential MR imaging of the brain and cervical spine was performed without intravenous contrast. \par \par COMPARISON: CT head 11/7/2018. MRI brain 11/5/2017. \par \par FINDINGS: \par \par MRI BRAIN: \par \par There is a right frontal marie hole with gliosis and hemosiderin deposition along the catheter tract in the right frontal lobe. \par \par Please note, there is an elongated, low-lying right cerebellar tonsil measuring approximately 5 mm below level of foramen magnum, unchanged compared to prior imaging and compatible with provided clinical history of Chiari I malformation, unchanged. There is no evidence of hydrocephalus. \par \par No acute infarction, intracranial hemorrhage or mass. \par \par There are no extra-axial fluid collections. The visualized skull base flow voids appear patent. \par \par The visualized intraorbital contents are normal. The frontal sinuses are hypoplastic. There is a tiny polyp/mucus retention cyst in the right maxillary sinus. There are mild mucosal palmitate changes of the ethmoid air cells. There are mild bibasilar cell effusions bilaterally. The visualized soft tissues and osseous structures otherwise appear within normal limits. \par \par \par MRI CERVICAL SPINE: \par \par ALIGNMENT: Straightening of the expected cervical lordosis, without listhesis. \par \par VERTEBRAE: The vertebral bodies are normal in height. There is no fracture or aggressive osseous lesion. \par \par DISCS: There is minimal degenerative loss of intervertebral disc is height at the mid cervical spine, C5-C6 and C6-C7 levels. \par \par CORD: There is no intrinsic spinal cord signal abnormality. Specifically, there is no evidence of syrinx in the cervical spinal cord. \par \par PARAVERTEBRAL SOFT TISSUES: The visualized paravertebral soft tissues appear within normal limits. \par \par EVALUATION OF INDIVIDUAL LEVELS: \par C2-3: No spinal canal or neuroforaminal stenosis. \par \par C3-4: No spinal canal or neuroforaminal stenosis. \par \par C4-5: No spinal canal or neuroforaminal stenosis. \par \par C5-6: No spinal canal or neuroforaminal stenosis. \par \par C6-7: Mild disc bulge. No spinal canal or neuroforaminal stenosis. \par \par C7-T1: No spinal canal or neuroforaminal stenosis. \par \par \par IMPRESSION: \par \par MRI BRAIN: Elongated, low-lying right cerebellar tonsil measuring approximately 5 mm below level of foramen magnum, unchanged compared to prior imaging and compatible with provided clinical history of Chiari I malformation, unchanged. There is no evidence of hydrocephalus. \par \par MRI CERVICAL SPINE: No intrinsic cord signal abnormality. Specifically, no evidence of syrinx. \par \par \par \par \par \par \par YECENIA ZEPEDA M.D., ATTENDING RADIOLOGIST \par This document has been electronically signed. Oct 6 2020 7:35PM \par \par

## 2020-11-06 NOTE — HISTORY OF PRESENT ILLNESS
[FreeTextEntry1] : To review, the patient is a 55-year-old female who unfortunately has been suffering from headaches beginning at the back of her head which worsens when coughing or sneezing.  Her headaches are associated with blurry vision,  photophobia, ringing in her ears, neck pain, hand weakness as evidenced by her dropping of objects, left greater than right upper and lower extremity numbness and tingling radiating to all of her fingers and her smaller toes, and balance issues.  Her  symptoms have caused her to stop working as a .  \par \par The patient was diagnosed with pseudotumor cerebri five or six years ago.  The patient was found via lumbar puncture to have an opening pressure of 22.  The patient was also found via an MRV to have left transverse sinus stenosis.  However, the patient  was not found via Neuro-Ophthalmology to have papilledema.  The patient has tried Diamox, with minimal to no relief.  \par \par The patient was diagnosed with Chiari I malformation this past September, with approximately 8 mm of descent of the cerebellar tonsils below the foramen magnum.  The patient was evaluated by neurosurgeon Dr. Aaron Tipton and referred to my office for  possible surgical intervention.  \par \par On 1/15/19 she underwent a  Suboccipital craniectomy. C1 laminectomy.\par \par Today she presents for follow up as she denies that her symptoms have resolved over the past few months.  She is under the care of Neurologist Dr. Susan Berry and takes Cyclobenzaprine prn along with Gabapentin 200 mg 1 x daily.\par \par

## 2020-11-06 NOTE — ASSESSMENT
[FreeTextEntry1] : IMPRESSION:\par 1. No obvious indication for surgery at this time - however will discuss at next Spine Conference.\par 2. The new MRI does not show anything new compared to previous imaging.\par \par \par PLAN:\par 1. Will discuss case at Spine Conference and contact patient.\par 2. Discussedpossible fusion C0 - C2 however will discuss at Spine Conference prior to making decision.\par No evidence of syringomyelia, papilledema nor hydrocephalus on imaging.\par

## 2020-11-09 RX ORDER — GABAPENTIN 100 MG/1
100 CAPSULE ORAL
Qty: 60 | Refills: 5 | Status: DISCONTINUED | COMMUNITY
Start: 2020-10-13 | End: 2020-11-09

## 2020-11-23 ENCOUNTER — APPOINTMENT (OUTPATIENT)
Dept: RHEUMATOLOGY | Facility: CLINIC | Age: 55
End: 2020-11-23

## 2020-12-21 ENCOUNTER — RX RENEWAL (OUTPATIENT)
Age: 55
End: 2020-12-21

## 2021-01-11 ENCOUNTER — APPOINTMENT (OUTPATIENT)
Dept: NEUROLOGY | Facility: CLINIC | Age: 56
End: 2021-01-11
Payer: COMMERCIAL

## 2021-01-11 DIAGNOSIS — Q79.62 HYPERMOBILE EHLERS-DANLOS SYNDROME: ICD-10-CM

## 2021-01-11 PROCEDURE — 99205 OFFICE O/P NEW HI 60 MIN: CPT | Mod: 95

## 2021-01-11 PROCEDURE — 99204 OFFICE O/P NEW MOD 45 MIN: CPT | Mod: 95

## 2021-01-11 PROCEDURE — 99243 OFF/OP CNSLTJ NEW/EST LOW 30: CPT | Mod: GT

## 2021-01-11 NOTE — REVIEW OF SYSTEMS
[Numbness] : numbness [Tingling] : tingling [Hypersensitivity] : hypersensitivity [Dizziness] : dizziness [Migraine Headache] : migraine headaches [Tension Headache] : tension-type headaches [Arthralgias] : arthralgias [Joint Pain] : joint pain [Negative] : Heme/Lymph [de-identified] : nausea

## 2021-01-11 NOTE — HISTORY OF PRESENT ILLNESS
[FreeTextEntry1] : She is 55-year-old patient seen today  on Tele health visit due to COVID emergency. History of chronic migraine headaches mixed with tension headaches and Chiari malformation with  Ehler  Danlos syndrome recently seen by rheumatologist no reports available.\par  Did not respond with Topamax. Stopped using the medication. In the past patient tried different medications including Imitrex and gabapentin nortriptyline by other neurologists. Patient seen by neurosurgery being followed by them. No intervention was recommended.\par Recent MRI scans did not reveal any acute pathology. Complaining about low back pain and left hip pain.\par \par Denies of any significant change since last visit except for persistent neck and back pain with headaches unchanged and persistent paresthesias numbness involving both upper extremities.

## 2021-01-11 NOTE — PHYSICAL EXAM
[General Appearance - Alert] : alert [General Appearance - In No Acute Distress] : in no acute distress [Oriented To Time, Place, And Person] : oriented to person, place, and time [Impaired Insight] : insight and judgment were intact [Affect] : the affect was normal [Person] : oriented to person [Place] : oriented to place [Time] : oriented to time [Concentration Intact] : normal concentrating ability [Visual Intact] : visual attention was ~T not ~L decreased [Naming Objects] : no difficulty naming common objects [Repeating Phrases] : no difficulty repeating a phrase [Writing A Sentence] : no difficulty writing a sentence [Fluency] : fluency intact [Comprehension] : comprehension intact [Reading] : reading intact [Past History] : adequate knowledge of personal past history [Limited Balance] : balance was intact [Past-pointing] : there was no past-pointing [Tremor] : no tremor present [2+] : Biceps left 2+ [Plantar Reflex Right Only] : normal on the right [Plantar Reflex Left Only] : normal on the left [Extraocular Movements] : extraocular movements were intact [Outer Ear] : the ears and nose were normal in appearance [Oropharynx] : the oropharynx was normal [Neck Appearance] : the appearance of the neck was normal [Neck Cervical Mass (___cm)] : no neck mass was observed [Jugular Venous Distention Increased] : there was no jugular-venous distention [Thyroid Diffuse Enlargement] : the thyroid was not enlarged [Thyroid Nodule] : there were no palpable thyroid nodules [Nail Clubbing] : no clubbing  or cyanosis of the fingernails [Motor Tone] : muscle strength and tone were normal [FreeTextEntry1] : Lax joints Ehler- danlos syndrome [] : no rash

## 2021-01-11 NOTE — DISCUSSION/SUMMARY
[FreeTextEntry1] : She is 55-year-old patient with a history of Chiari malformation status post surgery with decompression with persistent symptoms of dizziness and neck pain with paresthesias. Stable appearing MRI scans of the brain and cervical spine.\par \par Complaining about low back pain and hip pain.\par Currently not taking gabapentin and Topamax.\par Recommend continue cyclobenzaprine 10 mg at bedtime.\par MRI of the lumbar spine.\par Get medical records from rheumatologists and cardiologists.\par Followup evaluation in 3 months or as needed.\par Patient unable to tolerate physical therapy.\par Patient education provided and discussed the patient.\par Discussed with the patient regarding Ubrelvy or Nurtec . Patient will be given samples for trial.\par Follow up with you for other medical problems.\par

## 2021-01-11 NOTE — DATA REVIEWED
[de-identified] : MRI  Brain RT frontal buu hole with gliosis/hemosiderin along the catheter \par low lying Rt cerebellar tonsil measuring 5 mm below level of foramen magnum unchanged compared to prior imaging\par MRI c  spine No acute patholony no intrinsic  cord abnormality like syrinx

## 2021-01-11 NOTE — REASON FOR VISIT
[Home] : at home, [unfilled] , at the time of the visit. [Medical Office: ([facility])___] : at the medical office located in  [Follow-Up: _____] : a [unfilled] follow-up visit [FreeTextEntry1] : Follow up fro [facility] with Tension Headaches and LBP and ELD

## 2021-01-20 ENCOUNTER — APPOINTMENT (OUTPATIENT)
Dept: MRI IMAGING | Facility: CLINIC | Age: 56
End: 2021-01-20
Payer: COMMERCIAL

## 2021-01-20 ENCOUNTER — OUTPATIENT (OUTPATIENT)
Dept: OUTPATIENT SERVICES | Facility: HOSPITAL | Age: 56
LOS: 1 days | End: 2021-01-20
Payer: COMMERCIAL

## 2021-01-20 DIAGNOSIS — Z90.89 ACQUIRED ABSENCE OF OTHER ORGANS: Chronic | ICD-10-CM

## 2021-01-20 DIAGNOSIS — Z98.890 OTHER SPECIFIED POSTPROCEDURAL STATES: Chronic | ICD-10-CM

## 2021-01-20 DIAGNOSIS — Z90.49 ACQUIRED ABSENCE OF OTHER SPECIFIED PARTS OF DIGESTIVE TRACT: Chronic | ICD-10-CM

## 2021-01-20 DIAGNOSIS — L72.3 SEBACEOUS CYST: Chronic | ICD-10-CM

## 2021-01-20 DIAGNOSIS — M54.5 LOW BACK PAIN: ICD-10-CM

## 2021-01-20 PROCEDURE — 72148 MRI LUMBAR SPINE W/O DYE: CPT | Mod: 26

## 2021-01-20 PROCEDURE — 72148 MRI LUMBAR SPINE W/O DYE: CPT

## 2021-01-21 ENCOUNTER — NON-APPOINTMENT (OUTPATIENT)
Age: 56
End: 2021-01-21

## 2021-01-21 ENCOUNTER — TRANSCRIPTION ENCOUNTER (OUTPATIENT)
Age: 56
End: 2021-01-21

## 2021-02-03 ENCOUNTER — TRANSCRIPTION ENCOUNTER (OUTPATIENT)
Age: 56
End: 2021-02-03

## 2021-02-04 ENCOUNTER — TRANSCRIPTION ENCOUNTER (OUTPATIENT)
Age: 56
End: 2021-02-04

## 2021-02-27 ENCOUNTER — TRANSCRIPTION ENCOUNTER (OUTPATIENT)
Age: 56
End: 2021-02-27

## 2021-03-08 ENCOUNTER — TRANSCRIPTION ENCOUNTER (OUTPATIENT)
Age: 56
End: 2021-03-08

## 2021-03-09 ENCOUNTER — TRANSCRIPTION ENCOUNTER (OUTPATIENT)
Age: 56
End: 2021-03-09

## 2021-03-22 ENCOUNTER — TRANSCRIPTION ENCOUNTER (OUTPATIENT)
Age: 56
End: 2021-03-22

## 2021-04-05 ENCOUNTER — RX RENEWAL (OUTPATIENT)
Age: 56
End: 2021-04-05

## 2021-04-23 ENCOUNTER — APPOINTMENT (OUTPATIENT)
Dept: NEUROLOGY | Facility: CLINIC | Age: 56
End: 2021-04-23
Payer: COMMERCIAL

## 2021-04-23 VITALS
TEMPERATURE: 97.3 F | HEIGHT: 66 IN | HEART RATE: 98 BPM | DIASTOLIC BLOOD PRESSURE: 70 MMHG | BODY MASS INDEX: 22.02 KG/M2 | WEIGHT: 137 LBS | SYSTOLIC BLOOD PRESSURE: 105 MMHG

## 2021-04-23 PROCEDURE — 99072 ADDL SUPL MATRL&STAF TM PHE: CPT

## 2021-04-23 PROCEDURE — 99213 OFFICE O/P EST LOW 20 MIN: CPT

## 2021-04-23 RX ORDER — PROPRANOLOL HYDROCHLORIDE 20 MG/1
20 TABLET ORAL
Qty: 60 | Refills: 1 | Status: ACTIVE | COMMUNITY
Start: 2021-04-23 | End: 1900-01-01

## 2021-04-23 NOTE — REVIEW OF SYSTEMS
[Numbness] : numbness [Tingling] : tingling [Hypersensitivity] : hypersensitivity [Dizziness] : dizziness [Migraine Headache] : migraine headaches [Tension Headache] : tension-type headaches [Arthralgias] : arthralgias [Joint Pain] : joint pain [Negative] : Heme/Lymph [de-identified] : nausea

## 2021-04-23 NOTE — PHYSICAL EXAM
[General Appearance - Alert] : alert [General Appearance - In No Acute Distress] : in no acute distress [Impaired Insight] : insight and judgment were intact [Oriented To Time, Place, And Person] : oriented to person, place, and time [Affect] : the affect was normal [Person] : oriented to person [Place] : oriented to place [Time] : oriented to time [Concentration Intact] : normal concentrating ability [Visual Intact] : visual attention was ~T not ~L decreased [Naming Objects] : no difficulty naming common objects [Repeating Phrases] : no difficulty repeating a phrase [Writing A Sentence] : no difficulty writing a sentence [Fluency] : fluency intact [Comprehension] : comprehension intact [Reading] : reading intact [Past History] : adequate knowledge of personal past history [Cranial Nerves Optic (II)] : visual acuity intact bilaterally,  visual fields full to confrontation, pupils equal round and reactive to light [Cranial Nerves Oculomotor (III)] : extraocular motion intact [Cranial Nerves Trigeminal (V)] : facial sensation intact symmetrically [Cranial Nerves Facial (VII)] : face symmetrical [Cranial Nerves Vestibulocochlear (VIII)] : hearing was intact bilaterally [Cranial Nerves Glossopharyngeal (IX)] : tongue and palate midline [Cranial Nerves Accessory (XI - Cranial And Spinal)] : head turning and shoulder shrug symmetric [Cranial Nerves Hypoglossal (XII)] : there was no tongue deviation with protrusion [Motor Strength] : muscle strength was normal in all four extremities [No Muscle Atrophy] : normal bulk in all four extremities [Sensation Tactile Decrease] : light touch was intact [Balance] : balance was intact [Limited Balance] : balance was intact [Past-pointing] : there was no past-pointing [Tremor] : no tremor present [2+] : Ankle jerk left 2+ [Plantar Reflex Right Only] : normal on the right [Plantar Reflex Left Only] : normal on the left [Extraocular Movements] : extraocular movements were intact [Outer Ear] : the ears and nose were normal in appearance [Oropharynx] : the oropharynx was normal [Neck Cervical Mass (___cm)] : no neck mass was observed [Neck Appearance] : the appearance of the neck was normal [Jugular Venous Distention Increased] : there was no jugular-venous distention [Thyroid Diffuse Enlargement] : the thyroid was not enlarged [Thyroid Nodule] : there were no palpable thyroid nodules [Auscultation Breath Sounds / Voice Sounds] : lungs were clear to auscultation bilaterally [Heart Rate And Rhythm] : heart rate was normal and rhythm regular [Heart Sounds] : normal S1 and S2 [Heart Sounds Gallop] : no gallops [Murmurs] : no murmurs [Heart Sounds Pericardial Friction Rub] : no pericardial rub [Full Pulse] : the pedal pulses are present [Edema] : there was no peripheral edema [Abnormal Walk] : normal gait [Nail Clubbing] : no clubbing  or cyanosis of the fingernails [Musculoskeletal - Swelling] : no joint swelling seen [Motor Tone] : muscle strength and tone were normal [Skin Color & Pigmentation] : normal skin color and pigmentation [FreeTextEntry1] : Lax joints Ehler- danlos syndrome [Skin Turgor] : normal skin turgor [] : no rash

## 2021-04-23 NOTE — REASON FOR VISIT
[Follow-Up: _____] : a [unfilled] follow-up visit [FreeTextEntry1] : Follow up for pt with Ch Headaches with Chiari malformation and back pain

## 2021-04-23 NOTE — DISCUSSION/SUMMARY
[FreeTextEntry1] : She is 56-year-old patient with a history of Chiari malformation with the surgery with persistent symptoms of dizziness and chronic pain with headaches and cervical pain with the dizziness and paresthesias and tremor unchanged MRI and cervical spine and brain.\par Recommend patient to start on propranolol 20 mg in the daytime.\par Reduce Topamax to 25 mg at nighttime.\par Continue Nurtec as p.r.n. tolerated as needed.\par Recommend keeping  log of headaches.\par Does not want to go to physical therapy.\par Returns for followup evaluation 2-3 months or as needed.\par Follow up with you for her medical problems.\par Get MRI reports from  orthopedics for lumbar and thoracic spine reports.\par Patient education provided regarding medication side effects.

## 2021-04-26 ENCOUNTER — RX RENEWAL (OUTPATIENT)
Age: 56
End: 2021-04-26

## 2021-05-04 ENCOUNTER — RX RENEWAL (OUTPATIENT)
Age: 56
End: 2021-05-04

## 2021-06-30 ENCOUNTER — APPOINTMENT (OUTPATIENT)
Dept: NEUROLOGY | Facility: CLINIC | Age: 56
End: 2021-06-30
Payer: COMMERCIAL

## 2021-06-30 VITALS
SYSTOLIC BLOOD PRESSURE: 104 MMHG | HEART RATE: 94 BPM | HEIGHT: 66 IN | TEMPERATURE: 97.2 F | BODY MASS INDEX: 22.98 KG/M2 | WEIGHT: 143 LBS | DIASTOLIC BLOOD PRESSURE: 70 MMHG

## 2021-06-30 PROCEDURE — 99213 OFFICE O/P EST LOW 20 MIN: CPT

## 2021-06-30 RX ORDER — PROPRANOLOL HYDROCHLORIDE 60 MG/1
60 CAPSULE, EXTENDED RELEASE ORAL
Qty: 30 | Refills: 5 | Status: ACTIVE | COMMUNITY
Start: 2021-06-30 | End: 1900-01-01

## 2021-06-30 NOTE — REASON FOR VISIT
[Follow-Up: _____] : a [unfilled] follow-up visit [FreeTextEntry1] : Follow up for pt with Ch Migraines mixed with Tension Headaches and Ch Cervical and LBP

## 2021-06-30 NOTE — DISCUSSION/SUMMARY
[FreeTextEntry1] : This 56-year-old patient with history of Chiari malformation status post decompressive surgery with persistent headaches and chronic cervical pain with migraines mixed with tension headaches.\par Persistent paresthesias involving both upper extremities.\par Continue Topamax 25 mg 2 at night time.\par Continue Inderal increase to 60 mg once a day.\par Continue Nurtec 75 mg p.r.n. as a rescue medication.\par At this time she does not want repeat EMG/NCV studies.\par History of carpal tunnel syndrome and had surgery 20 years ago.\par Return for a followup evaluation 3 months or as needed.\par Patient education provided and discussed with the patient.\par Followup with you for other medical problems.

## 2021-06-30 NOTE — REVIEW OF SYSTEMS
[Numbness] : numbness [Tingling] : tingling [Hypersensitivity] : hypersensitivity [Dizziness] : dizziness [Migraine Headache] : migraine headaches [Tension Headache] : tension-type headaches [Arthralgias] : arthralgias [Joint Pain] : joint pain [Negative] : Heme/Lymph

## 2021-06-30 NOTE — DATA REVIEWED
[de-identified] : MRI  Brain RT frontal buu hole with gliosis/hemosiderin along the catheter \par low lying Rt cerebellar tonsil measuring 5 mm below level of foramen magnum unchanged compared to prior imaging\par MRI c  spine No acute pathology no intrinsic  cord abnormality like syrinx

## 2021-06-30 NOTE — HISTORY OF PRESENT ILLNESS
[FreeTextEntry1] : She is 56-year-old patient coming here for followup evaluation for chronic migraine headaches mixed with tension headaches with incidental Chiari malformation status post surgery with persistent headaches and cervical pain improved symptoms since started on Topamax and Inderal and also uses Nurtec  for breakthrough headaches. \par Intermittent symptoms of paresthesias involving both hands not related with activities. Does not want to do EMG/NCV studies.\par Repeat MRI scans did not reveal any acute pathology persistent low-lying cerebellar tonsils unchanged from prior scan.\par Headaches improved significantly since last visit still has breakthrough headaches.\par

## 2021-06-30 NOTE — PHYSICAL EXAM
[General Appearance - Alert] : alert [General Appearance - In No Acute Distress] : in no acute distress [Oriented To Time, Place, And Person] : oriented to person, place, and time [Impaired Insight] : insight and judgment were intact [Affect] : the affect was normal [Place] : oriented to place [Person] : oriented to person [Time] : oriented to time [Concentration Intact] : normal concentrating ability [Visual Intact] : visual attention was ~T not ~L decreased [Naming Objects] : no difficulty naming common objects [Repeating Phrases] : no difficulty repeating a phrase [Writing A Sentence] : no difficulty writing a sentence [Fluency] : fluency intact [Comprehension] : comprehension intact [Reading] : reading intact [Past History] : adequate knowledge of personal past history [Cranial Nerves Optic (II)] : visual acuity intact bilaterally,  visual fields full to confrontation, pupils equal round and reactive to light [Cranial Nerves Oculomotor (III)] : extraocular motion intact [Cranial Nerves Trigeminal (V)] : facial sensation intact symmetrically [Cranial Nerves Facial (VII)] : face symmetrical [Cranial Nerves Vestibulocochlear (VIII)] : hearing was intact bilaterally [Cranial Nerves Glossopharyngeal (IX)] : tongue and palate midline [Cranial Nerves Accessory (XI - Cranial And Spinal)] : head turning and shoulder shrug symmetric [Cranial Nerves Hypoglossal (XII)] : there was no tongue deviation with protrusion [Motor Strength] : muscle strength was normal in all four extremities [No Muscle Atrophy] : normal bulk in all four extremities [Sensation Tactile Decrease] : light touch was intact [Balance] : balance was intact [Limited Balance] : balance was intact [Past-pointing] : there was no past-pointing [Tremor] : no tremor present [2+] : Ankle jerk left 2+ [Plantar Reflex Right Only] : normal on the right [Plantar Reflex Left Only] : normal on the left [Extraocular Movements] : extraocular movements were intact [Outer Ear] : the ears and nose were normal in appearance [Oropharynx] : the oropharynx was normal [Neck Appearance] : the appearance of the neck was normal [Neck Cervical Mass (___cm)] : no neck mass was observed [Jugular Venous Distention Increased] : there was no jugular-venous distention [Thyroid Diffuse Enlargement] : the thyroid was not enlarged [Thyroid Nodule] : there were no palpable thyroid nodules [Auscultation Breath Sounds / Voice Sounds] : lungs were clear to auscultation bilaterally [Heart Rate And Rhythm] : heart rate was normal and rhythm regular [Heart Sounds] : normal S1 and S2 [Heart Sounds Gallop] : no gallops [Murmurs] : no murmurs [Heart Sounds Pericardial Friction Rub] : no pericardial rub [Full Pulse] : the pedal pulses are present [Edema] : there was no peripheral edema [Abnormal Walk] : normal gait [Nail Clubbing] : no clubbing  or cyanosis of the fingernails [Musculoskeletal - Swelling] : no joint swelling seen [Motor Tone] : muscle strength and tone were normal [FreeTextEntry1] : Lax joints Ehler- danlos syndrome [Skin Color & Pigmentation] : normal skin color and pigmentation [Skin Turgor] : normal skin turgor [] : no rash

## 2021-07-30 ENCOUNTER — NON-APPOINTMENT (OUTPATIENT)
Age: 56
End: 2021-07-30

## 2021-07-30 ENCOUNTER — APPOINTMENT (OUTPATIENT)
Dept: ORTHOPEDIC SURGERY | Facility: CLINIC | Age: 56
End: 2021-07-30
Payer: COMMERCIAL

## 2021-07-30 DIAGNOSIS — M79.641 PAIN IN RIGHT HAND: ICD-10-CM

## 2021-07-30 DIAGNOSIS — M19.049 PRIMARY OSTEOARTHRITIS, UNSPECIFIED HAND: ICD-10-CM

## 2021-07-30 PROCEDURE — 99204 OFFICE O/P NEW MOD 45 MIN: CPT

## 2021-07-30 PROCEDURE — 73110 X-RAY EXAM OF WRIST: CPT | Mod: RT

## 2021-07-30 NOTE — PHYSICAL EXAM
[Normal Touch] : sensation intact for  touch [Normal] : no peripheral adenopathy appreciated [de-identified] : There is tenderness over the right basal joint with a positive grind test and shoulder deformity. There is a positive crank test. There is swelling appreciated over the affected CMC joint which is not present on the opposite side. There is no evidence of infection or lymphadenopathy bilaterally to the level of the elbows There is no evidence of MCP hyperextension bilaterally. There is a negative Finkelstein's test There is no tenderness over the A-1 pulleys bilaterally. 5/5 stregnth bilaterally. \par \par The wrists have a symmetric and full range of motion bilaterally with no pain upon forced flexion, extension, pronation and supination. There is no tenderness over the scaphoid scapholunate region ligaments and no tenderness of the TFCC bilaterally. There is no tenderness of the pisotriquetral hamate hook. The second through fifth CMC his is stable and nontender bilaterally.\par There is a negative carpal tunnel compression test or Tinel's bilaterally.   [de-identified] : darlener [de-identified] : 3 x-ray views of the right wrist are reviewed there is significant degeneration of the basal joint with osteophyte formation of the trapezium

## 2021-07-30 NOTE — ASSESSMENT
[FreeTextEntry1] : 56-year-old female presents with pain at the base of her right thumb consistent with advanced basal joint arthritis. I recommend conservative treatment at this time with a comfort during activity and anti-inflammatory medication. She will followup if she is interested in a cortisone injection.

## 2021-07-30 NOTE — HISTORY OF PRESENT ILLNESS
[FreeTextEntry1] : 56 year female with history of EDS, presents for evaluation of right thumb pain. She notes her pain has been present for many years, now worsening over the last couple of months. She reports she is now unable to  or turn objects without pain. She localizes the pain to the base of the right thumb, dull and achy at rest, sharp with activity. She notes associated stiffness and worsening deformity as well. She admits to use of tylenol and advil on a regular basis. She denies paresthesias. 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 She presents for options for treatment of her thumb pain as it is interfering with regular activity.

## 2021-08-28 ENCOUNTER — EMERGENCY (EMERGENCY)
Facility: HOSPITAL | Age: 56
LOS: 0 days | Discharge: HOME | End: 2021-08-28
Attending: EMERGENCY MEDICINE
Payer: COMMERCIAL

## 2021-08-28 VITALS
HEART RATE: 69 BPM | HEIGHT: 66 IN | SYSTOLIC BLOOD PRESSURE: 107 MMHG | WEIGHT: 145.06 LBS | OXYGEN SATURATION: 98 % | RESPIRATION RATE: 16 BRPM | TEMPERATURE: 97 F | DIASTOLIC BLOOD PRESSURE: 74 MMHG

## 2021-08-28 DIAGNOSIS — M54.2 CERVICALGIA: ICD-10-CM

## 2021-08-28 DIAGNOSIS — Z98.890 OTHER SPECIFIED POSTPROCEDURAL STATES: Chronic | ICD-10-CM

## 2021-08-28 DIAGNOSIS — Z90.79 ACQUIRED ABSENCE OF OTHER GENITAL ORGAN(S): ICD-10-CM

## 2021-08-28 DIAGNOSIS — Z98.890 OTHER SPECIFIED POSTPROCEDURAL STATES: ICD-10-CM

## 2021-08-28 DIAGNOSIS — Z88.0 ALLERGY STATUS TO PENICILLIN: ICD-10-CM
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
## 2021-08-28 DIAGNOSIS — G47.33 OBSTRUCTIVE SLEEP APNEA (ADULT) (PEDIATRIC): ICD-10-CM

## 2021-08-28 DIAGNOSIS — Z90.89 ACQUIRED ABSENCE OF OTHER ORGANS: ICD-10-CM

## 2021-08-28 DIAGNOSIS — Z88.1 ALLERGY STATUS TO OTHER ANTIBIOTIC AGENTS STATUS: ICD-10-CM

## 2021-08-28 DIAGNOSIS — G91.9 HYDROCEPHALUS, UNSPECIFIED: ICD-10-CM

## 2021-08-28 DIAGNOSIS — S46.911A STRAIN OF UNSPECIFIED MUSCLE, FASCIA AND TENDON AT SHOULDER AND UPPER ARM LEVEL, RIGHT ARM, INITIAL ENCOUNTER: ICD-10-CM

## 2021-08-28 DIAGNOSIS — M25.511 PAIN IN RIGHT SHOULDER: ICD-10-CM

## 2021-08-28 DIAGNOSIS — L72.3 SEBACEOUS CYST: Chronic | ICD-10-CM

## 2021-08-28 DIAGNOSIS — Z90.49 ACQUIRED ABSENCE OF OTHER SPECIFIED PARTS OF DIGESTIVE TRACT: Chronic | ICD-10-CM

## 2021-08-28 DIAGNOSIS — Q79.60 EHLERS-DANLOS SYNDROME, UNSPECIFIED: ICD-10-CM

## 2021-08-28 DIAGNOSIS — Z90.89 ACQUIRED ABSENCE OF OTHER ORGANS: Chronic | ICD-10-CM
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
## 2021-08-28 PROCEDURE — 73030 X-RAY EXAM OF SHOULDER: CPT | Mod: 26,RT

## 2021-08-28 PROCEDURE — 73030 X-RAY EXAM OF SHOULDER: CPT | Mod: RT

## 2021-08-28 PROCEDURE — 99284 EMERGENCY DEPT VISIT MOD MDM: CPT

## 2021-08-28 PROCEDURE — 99283 EMERGENCY DEPT VISIT LOW MDM: CPT | Mod: 25

## 2021-08-28 RX ORDER — IBUPROFEN 200 MG
1 TABLET ORAL
Qty: 30 | Refills: 0
Start: 2021-08-28

## 2021-08-28 RX ORDER — DIAZEPAM 5 MG
5 TABLET ORAL ONCE
Refills: 0 | Status: DISCONTINUED | OUTPATIENT
Start: 2021-08-28 | End: 2021-08-28

## 2021-08-28 RX ORDER — DIAZEPAM 5 MG
1 TABLET ORAL
Qty: 6 | Refills: 0
Start: 2021-08-28 | End: 2021-08-30

## 2021-08-28 RX ORDER — IBUPROFEN 200 MG
600 TABLET ORAL ONCE
Refills: 0 | Status: COMPLETED | OUTPATIENT
Start: 2021-08-28 | End: 2021-08-28

## 2021-08-28 RX ADMIN — Medication 5 MILLIGRAM(S): at 14:10

## 2021-08-28 RX ADMIN — Medication 600 MILLIGRAM(S): at 14:10
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
## 2021-08-28 NOTE — ED STATDOCS - NSICDXPASTMEDICALHX_GEN_ALL_CORE_FT
PAST MEDICAL HISTORY:  Chiari I malformation     EDS (Charlotte-Danlos syndrome)     Hydrocephalus     Intracranial hypertension     MELI (obstructive sleep apnea) mild, no treatment    TMJ (dislocation of temporomandibular joint)

## 2021-08-28 NOTE — ED STATDOCS - PATIENT PORTAL LINK FT
You can access the FollowMyHealth Patient Portal offered by [facility] by registering at the following website: [email or web address]. By joining [facility]’s FollowMyHealth portal, you will also be able to view your health information using other applications (apps) compatible with our system.

## 2021-08-28 NOTE — ED STATDOCS - MUSCULOSKELETAL, MLM
tenderness along R cervical paraspinal and R trap to shoulder. tenderness along R cervical paraspinal and R trapezius to shoulder.
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
## 2021-08-28 NOTE — ED STATDOCS - NSICDXPASTSURGICALHX_GEN_ALL_CORE_FT
PAST SURGICAL HISTORY:  History of marie hole surgery 11/6/2018    S/P appendectomy 2015    S/P arthroscopic knee surgery bilateral, irrigation and debridement for Staph infection of right knee at age 18 ( last -1983) total 7 knee surgery    S/P carpal tunnel release 2001, 2002    S/P tonsillectomy and adenoidectomy     Sebaceous cyst S/P excision

## 2021-09-23 ENCOUNTER — APPOINTMENT (OUTPATIENT)
Dept: NEUROLOGY | Facility: CLINIC | Age: 56
End: 2021-09-23
Payer: COMMERCIAL

## 2021-09-23 VITALS
HEART RATE: 89 BPM | HEIGHT: 66 IN | TEMPERATURE: 97.8 F | SYSTOLIC BLOOD PRESSURE: 100 MMHG | BODY MASS INDEX: 23.3 KG/M2 | WEIGHT: 145 LBS | DIASTOLIC BLOOD PRESSURE: 70 MMHG

## 2021-09-23 DIAGNOSIS — M62.838 OTHER MUSCLE SPASM: ICD-10-CM

## 2021-09-23 DIAGNOSIS — M54.2 CERVICALGIA: ICD-10-CM

## 2021-09-23 DIAGNOSIS — G89.28 CERVICALGIA: ICD-10-CM

## 2021-09-23 DIAGNOSIS — G89.29 LOW BACK PAIN: ICD-10-CM

## 2021-09-23 DIAGNOSIS — Z98.890 CERVICALGIA: ICD-10-CM

## 2021-09-23 DIAGNOSIS — M54.5 LOW BACK PAIN: ICD-10-CM

## 2021-09-23 PROCEDURE — 99214 OFFICE O/P EST MOD 30 MIN: CPT

## 2021-09-23 RX ORDER — CYCLOBENZAPRINE HYDROCHLORIDE 10 MG/1
10 TABLET, FILM COATED ORAL
Qty: 30 | Refills: 3 | Status: ACTIVE | COMMUNITY
Start: 2020-10-13 | End: 1900-01-01

## 2021-09-23 RX ORDER — TOPIRAMATE 25 MG/1
25 TABLET, FILM COATED ORAL
Qty: 120 | Refills: 5 | Status: ACTIVE | COMMUNITY
Start: 2020-11-09 | End: 1900-01-01

## 2021-09-23 NOTE — PHYSICAL EXAM
[General Appearance - Alert] : alert [General Appearance - In No Acute Distress] : in no acute distress [Oriented To Time, Place, And Person] : oriented to person, place, and time [Impaired Insight] : insight and judgment were intact [Affect] : the affect was normal [Person] : oriented to person [Place] : oriented to place [Time] : oriented to time [Concentration Intact] : normal concentrating ability [Visual Intact] : visual attention was ~T not ~L decreased [Naming Objects] : no difficulty naming common objects [Repeating Phrases] : no difficulty repeating a phrase [Writing A Sentence] : no difficulty writing a sentence [Fluency] : fluency intact [Comprehension] : comprehension intact [Reading] : reading intact [Past History] : adequate knowledge of personal past history [Cranial Nerves Optic (II)] : visual acuity intact bilaterally,  visual fields full to confrontation, pupils equal round and reactive to light [Cranial Nerves Oculomotor (III)] : extraocular motion intact [Cranial Nerves Trigeminal (V)] : facial sensation intact symmetrically [Cranial Nerves Facial (VII)] : face symmetrical [Cranial Nerves Vestibulocochlear (VIII)] : hearing was intact bilaterally [Cranial Nerves Glossopharyngeal (IX)] : tongue and palate midline [Cranial Nerves Accessory (XI - Cranial And Spinal)] : head turning and shoulder shrug symmetric [Cranial Nerves Hypoglossal (XII)] : there was no tongue deviation with protrusion [No Muscle Atrophy] : normal bulk in all four extremities [Motor Strength] : muscle strength was normal in all four extremities [Sensation Tactile Decrease] : light touch was intact [Balance] : balance was intact [2+] : Ankle jerk left 2+ [Extraocular Movements] : extraocular movements were intact [Outer Ear] : the ears and nose were normal in appearance [Oropharynx] : the oropharynx was normal [Neck Appearance] : the appearance of the neck was normal [Neck Cervical Mass (___cm)] : no neck mass was observed [Jugular Venous Distention Increased] : there was no jugular-venous distention [Thyroid Diffuse Enlargement] : the thyroid was not enlarged [Thyroid Nodule] : there were no palpable thyroid nodules [Auscultation Breath Sounds / Voice Sounds] : lungs were clear to auscultation bilaterally [Heart Rate And Rhythm] : heart rate was normal and rhythm regular [Heart Sounds] : normal S1 and S2 [Heart Sounds Gallop] : no gallops [Murmurs] : no murmurs [Heart Sounds Pericardial Friction Rub] : no pericardial rub [Full Pulse] : the pedal pulses are present [Edema] : there was no peripheral edema [Abnormal Walk] : normal gait [Nail Clubbing] : no clubbing  or cyanosis of the fingernails [Musculoskeletal - Swelling] : no joint swelling seen [Motor Tone] : muscle strength and tone were normal [Skin Color & Pigmentation] : normal skin color and pigmentation [Skin Turgor] : normal skin turgor [Limited Balance] : balance was intact [Past-pointing] : there was no past-pointing [Tremor] : no tremor present [Plantar Reflex Right Only] : normal on the right [Plantar Reflex Left Only] : normal on the left [Bowel Sounds] : normal bowel sounds [Abdomen Soft] : soft [Abdomen Tenderness] : non-tender [] : no hepato-splenomegaly [Abdomen Mass (___ Cm)] : no abdominal mass palpated [FreeTextEntry1] : Lax joints Ehler- danlos syndrome

## 2021-09-23 NOTE — DATA REVIEWED
[de-identified] : MRI  Brain RT frontal marie hole with gliosis/hemosiderin along the catheter \par low lying Rt cerebellar tonsil measuring 5 mm below level of foramen magnum unchanged compared to prior imaging\par MRI c  spine No acute pathology no intrinsic  cord abnormality like syrinx

## 2021-09-23 NOTE — HISTORY OF PRESENT ILLNESS
[FreeTextEntry1] : She is 57 Yo with H/o Ch Migraines mixed with tension Headaches and Chiari Malformation with Ch Cervical pain  S/p Surgery \par Improved Headaches with Topamax and Inderal but now break through Headaches.\par Uses Maxalt and Cyclobenzaprine Prn.\par No other focal sx.\par

## 2021-09-23 NOTE — DISCUSSION/SUMMARY
[FreeTextEntry1] : Pt with Ch Migraines mixed with tension Headaches and Chiari Malformation  with persistent Break through Headaches.\par Rec Increasing Topamax to 25 mg q am and 50 mg q hs ti trate to 50 mg twice a day.\par taper off Inderal.\par Repeat Imaging of  c spine/ Brain. follow up.\par Continue Maxalt/ Flexeril prn.\par Will follow up in 2-3  months.
[phone]

## 2021-09-23 NOTE — REASON FOR VISIT
99 [Follow-Up: _____] : a [unfilled] follow-up visit [FreeTextEntry1] : Follow up for pt with CH Headaches and Cervical Pain and Cervical muscle spasm

## 2021-10-07 ENCOUNTER — RX RENEWAL (OUTPATIENT)
Age: 56
End: 2021-10-07

## 2021-10-07 RX ORDER — RIMEGEPANT SULFATE 75 MG/75MG
75 TABLET, ORALLY DISINTEGRATING ORAL
Qty: 8 | Refills: 5 | Status: ACTIVE | COMMUNITY
Start: 2021-03-08 | End: 1900-01-01

## 2021-10-13 ENCOUNTER — APPOINTMENT (OUTPATIENT)
Dept: MRI IMAGING | Facility: CLINIC | Age: 56
End: 2021-10-13
Payer: COMMERCIAL

## 2021-10-13 ENCOUNTER — OUTPATIENT (OUTPATIENT)
Dept: OUTPATIENT SERVICES | Facility: HOSPITAL | Age: 56
LOS: 1 days | End: 2021-10-13
Payer: COMMERCIAL

## 2021-10-13 DIAGNOSIS — Z98.890 OTHER SPECIFIED POSTPROCEDURAL STATES: Chronic | ICD-10-CM

## 2021-10-13 DIAGNOSIS — L72.3 SEBACEOUS CYST: Chronic | ICD-10-CM

## 2021-10-13 DIAGNOSIS — M54.2 CERVICALGIA: ICD-10-CM

## 2021-10-13 DIAGNOSIS — Z90.49 ACQUIRED ABSENCE OF OTHER SPECIFIED PARTS OF DIGESTIVE TRACT: Chronic | ICD-10-CM

## 2021-10-13 DIAGNOSIS — G93.5 COMPRESSION OF BRAIN: ICD-10-CM

## 2021-10-13 DIAGNOSIS — Z90.89 ACQUIRED ABSENCE OF OTHER ORGANS: Chronic | ICD-10-CM

## 2021-10-13 PROCEDURE — 70553 MRI BRAIN STEM W/O & W/DYE: CPT | Mod: 26

## 2021-10-13 PROCEDURE — 70553 MRI BRAIN STEM W/O & W/DYE: CPT

## 2021-10-13 PROCEDURE — 72156 MRI NECK SPINE W/O & W/DYE: CPT | Mod: 26

## 2021-10-13 PROCEDURE — 72156 MRI NECK SPINE W/O & W/DYE: CPT

## 2021-10-13 PROCEDURE — A9585: CPT

## 2022-01-10 ENCOUNTER — INPATIENT (INPATIENT)
Facility: HOSPITAL | Age: 57
LOS: 2 days | Discharge: HOME | DRG: 558 | End: 2022-01-13
Attending: ORTHOPAEDIC SURGERY | Admitting: ORTHOPAEDIC SURGERY
Payer: COMMERCIAL

## 2022-01-10 VITALS — HEIGHT: 66 IN | WEIGHT: 145.06 LBS

## 2022-01-10 DIAGNOSIS — Z98.890 OTHER SPECIFIED POSTPROCEDURAL STATES: Chronic | ICD-10-CM

## 2022-01-10 DIAGNOSIS — L72.3 SEBACEOUS CYST: Chronic | ICD-10-CM

## 2022-01-10 DIAGNOSIS — Z90.89 ACQUIRED ABSENCE OF OTHER ORGANS: Chronic | ICD-10-CM

## 2022-01-10 DIAGNOSIS — Z90.49 ACQUIRED ABSENCE OF OTHER SPECIFIED PARTS OF DIGESTIVE TRACT: Chronic | ICD-10-CM

## 2022-01-10 LAB
ANION GAP SERPL CALC-SCNC: 5 MMOL/L — SIGNIFICANT CHANGE UP (ref 5–17)
BASOPHILS # BLD AUTO: 0.05 K/UL — SIGNIFICANT CHANGE UP (ref 0–0.2)
BASOPHILS NFR BLD AUTO: 0.3 % — SIGNIFICANT CHANGE UP (ref 0–2)
BUN SERPL-MCNC: 11 MG/DL — SIGNIFICANT CHANGE UP (ref 7–23)
CALCIUM SERPL-MCNC: 9.5 MG/DL — SIGNIFICANT CHANGE UP (ref 8.5–10.1)
CHLORIDE SERPL-SCNC: 103 MMOL/L — SIGNIFICANT CHANGE UP (ref 96–108)
CO2 SERPL-SCNC: 29 MMOL/L — SIGNIFICANT CHANGE UP (ref 22–31)
CREAT SERPL-MCNC: 0.76 MG/DL — SIGNIFICANT CHANGE UP (ref 0.5–1.3)
EOSINOPHIL # BLD AUTO: 0.04 K/UL — SIGNIFICANT CHANGE UP (ref 0–0.5)
EOSINOPHIL NFR BLD AUTO: 0.3 % — SIGNIFICANT CHANGE UP (ref 0–6)
ERYTHROCYTE [SEDIMENTATION RATE] IN BLOOD: 6 MM/HR — SIGNIFICANT CHANGE UP (ref 0–20)
GLUCOSE SERPL-MCNC: 88 MG/DL — SIGNIFICANT CHANGE UP (ref 70–99)
HCT VFR BLD CALC: 43.1 % — SIGNIFICANT CHANGE UP (ref 34.5–45)
HGB BLD-MCNC: 14.6 G/DL — SIGNIFICANT CHANGE UP (ref 11.5–15.5)
IMM GRANULOCYTES NFR BLD AUTO: 0.4 % — SIGNIFICANT CHANGE UP (ref 0–1.5)
LYMPHOCYTES # BLD AUTO: 1.33 K/UL — SIGNIFICANT CHANGE UP (ref 1–3.3)
LYMPHOCYTES # BLD AUTO: 8.3 % — LOW (ref 13–44)
MCHC RBC-ENTMCNC: 33.5 PG — SIGNIFICANT CHANGE UP (ref 27–34)
MCHC RBC-ENTMCNC: 33.9 GM/DL — SIGNIFICANT CHANGE UP (ref 32–36)
MCV RBC AUTO: 98.9 FL — SIGNIFICANT CHANGE UP (ref 80–100)
MONOCYTES # BLD AUTO: 0.86 K/UL — SIGNIFICANT CHANGE UP (ref 0–0.9)
MONOCYTES NFR BLD AUTO: 5.4 % — SIGNIFICANT CHANGE UP (ref 2–14)
NEUTROPHILS # BLD AUTO: 13.6 K/UL — HIGH (ref 1.8–7.4)
NEUTROPHILS NFR BLD AUTO: 85.3 % — HIGH (ref 43–77)
PLATELET # BLD AUTO: 320 K/UL — SIGNIFICANT CHANGE UP (ref 150–400)
POTASSIUM SERPL-MCNC: 3.5 MMOL/L — SIGNIFICANT CHANGE UP (ref 3.5–5.3)
POTASSIUM SERPL-SCNC: 3.5 MMOL/L — SIGNIFICANT CHANGE UP (ref 3.5–5.3)
RBC # BLD: 4.36 M/UL — SIGNIFICANT CHANGE UP (ref 3.8–5.2)
RBC # FLD: 12.3 % — SIGNIFICANT CHANGE UP (ref 10.3–14.5)
SODIUM SERPL-SCNC: 137 MMOL/L — SIGNIFICANT CHANGE UP (ref 135–145)
WBC # BLD: 15.94 K/UL — HIGH (ref 3.8–10.5)
WBC # FLD AUTO: 15.94 K/UL — HIGH (ref 3.8–10.5)

## 2022-01-10 PROCEDURE — 99285 EMERGENCY DEPT VISIT HI MDM: CPT

## 2022-01-10 PROCEDURE — 73130 X-RAY EXAM OF HAND: CPT | Mod: 26,RT

## 2022-01-10 RX ADMIN — Medication 100 MILLIGRAM(S): at 22:17

## 2022-01-10 RX ADMIN — Medication 110 MILLIGRAM(S): at 23:19

## 2022-01-10 NOTE — CONSULT NOTE ADULT - SUBJECTIVE AND OBJECTIVE BOX
56F FELI presents after cat bite injury today at noon. She was trying to put her cat in her carrier when her cat scratched her on the volar surface of her palm. She noticed pain and puncture sites over the volar surface of the index finger on the right hand but continued about her day. However, her finger began to swell throughout the day and she began to have pain in the palm proximal to her index finger. She went to urgent care at about 2000, where she was referred to come to the ED for further evaluation and management. In the ED, patient notes that the finger has become progressively more swollen. She also has pain over the area of the index flexor sheath in the palm. She is still able to bend the digits but cannot make a full fist. She notes slightly diminished sensation of the fingers at baseline 2/2 her Chiari malformation. Patient denies any fevers, chills, numbness, tingling, weakness, or any other orthopaedic complaint.     Exam:   T(C): 37.1 (01-10-22 @ 21:01), Max: 37.1 (01-10-22 @ 21:01)  T(F): 98.7 (01-10-22 @ 21:01), Max: 98.7 (01-10-22 @ 21:01)  HR: 106 (01-10-22 @ 21:01) (106 - 106)  BP: 116/77 (01-10-22 @ 21:01) (116/77 - 116/77)  RR: 18 (01-10-22 @ 21:01) (18 - 18)  SpO2: 100% (01-10-22 @ 21:01) (100% - 100%)    Gen: resting in bed, NAD  RUE:  Two puncture wounds present on the volar surface of the middle phalanx of the second digit. Swelling present of the second digit to the palm. Erythema of the digit noted.   TTP over the digit and over the flexor tendon sheath in the palm. Minimal pain with extension of the second digit. Decreased ROM at DIP and PIP of index finger 2/2 swelling.   SILT C5-T1  Ax/rad/msc/rad/med/uln/ain/pin intact.   Radial pulse palpable.  No calf tenderness bilaterally.  Compartments soft and compressible.     Laboratory Results:   CBC, 01-10-22 @ 21:48    WBC: 15.94  Hgb: 14.6  Hct: 43.1  Plt: 320          Imaging: XR of the hand reviewed demonstrating no bony abnormalities.

## 2022-01-10 NOTE — ED ADULT NURSE NOTE - CHIEF COMPLAINT QUOTE
right hand swelling x 2 hours, scratched by her cat earlier today. seen at [facility], sent to ED for further evaluation

## 2022-01-10 NOTE — ED STATDOCS - OBJECTIVE STATEMENT
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 Pt is right hand dominant. Allergic to Penicillin and Vancomycin.

## 2022-01-10 NOTE — ED STATDOCS - MUSCULOSKELETAL, MLM
Right hand dominant. Sarver-neck deformity of right middle finger. Right index finger: with redness, swelling, unable to flex finger, pain on passive extension, +Kanavel's sign.

## 2022-01-10 NOTE — ED ADULT TRIAGE NOTE - CHIEF COMPLAINT QUOTE
right hand swelling x 2 hours, scratched by her cat earlier today. seen at [facility], sent to ED for further evaluation

## 2022-01-11 ENCOUNTER — TRANSCRIPTION ENCOUNTER (OUTPATIENT)
Age: 57
End: 2022-01-11

## 2022-01-11 DIAGNOSIS — M65.9 SYNOVITIS AND TENOSYNOVITIS, UNSPECIFIED: ICD-10-CM

## 2022-01-11 LAB
ANION GAP SERPL CALC-SCNC: 7 MMOL/L — SIGNIFICANT CHANGE UP (ref 5–17)
APTT BLD: 29.8 SEC — SIGNIFICANT CHANGE UP (ref 27.5–35.5)
APTT BLD: 31.1 SEC — SIGNIFICANT CHANGE UP (ref 27.5–35.5)
BUN SERPL-MCNC: 13 MG/DL — SIGNIFICANT CHANGE UP (ref 7–23)
CALCIUM SERPL-MCNC: 8.8 MG/DL — SIGNIFICANT CHANGE UP (ref 8.5–10.1)
CHLORIDE SERPL-SCNC: 107 MMOL/L — SIGNIFICANT CHANGE UP (ref 96–108)
CO2 SERPL-SCNC: 26 MMOL/L — SIGNIFICANT CHANGE UP (ref 22–31)
CREAT SERPL-MCNC: 0.58 MG/DL — SIGNIFICANT CHANGE UP (ref 0.5–1.3)
CRP SERPL-MCNC: <3 MG/L — SIGNIFICANT CHANGE UP
GLUCOSE SERPL-MCNC: 97 MG/DL — SIGNIFICANT CHANGE UP (ref 70–99)
HCT VFR BLD CALC: 39.5 % — SIGNIFICANT CHANGE UP (ref 34.5–45)
HGB BLD-MCNC: 13.5 G/DL — SIGNIFICANT CHANGE UP (ref 11.5–15.5)
INR BLD: 1.02 RATIO — SIGNIFICANT CHANGE UP (ref 0.88–1.16)
INR BLD: 1.04 RATIO — SIGNIFICANT CHANGE UP (ref 0.88–1.16)
MCHC RBC-ENTMCNC: 33.8 PG — SIGNIFICANT CHANGE UP (ref 27–34)
MCHC RBC-ENTMCNC: 34.2 GM/DL — SIGNIFICANT CHANGE UP (ref 32–36)
MCV RBC AUTO: 99 FL — SIGNIFICANT CHANGE UP (ref 80–100)
PLATELET # BLD AUTO: 275 K/UL — SIGNIFICANT CHANGE UP (ref 150–400)
POTASSIUM SERPL-MCNC: 3.6 MMOL/L — SIGNIFICANT CHANGE UP (ref 3.5–5.3)
POTASSIUM SERPL-SCNC: 3.6 MMOL/L — SIGNIFICANT CHANGE UP (ref 3.5–5.3)
PROTHROM AB SERPL-ACNC: 11.8 SEC — SIGNIFICANT CHANGE UP (ref 10.6–13.6)
PROTHROM AB SERPL-ACNC: 12.1 SEC — SIGNIFICANT CHANGE UP (ref 10.6–13.6)
RBC # BLD: 3.99 M/UL — SIGNIFICANT CHANGE UP (ref 3.8–5.2)
RBC # FLD: 12.4 % — SIGNIFICANT CHANGE UP (ref 10.3–14.5)
SARS-COV-2 RNA SPEC QL NAA+PROBE: SIGNIFICANT CHANGE UP
SODIUM SERPL-SCNC: 140 MMOL/L — SIGNIFICANT CHANGE UP (ref 135–145)
WBC # BLD: 10.78 K/UL — HIGH (ref 3.8–10.5)
WBC # FLD AUTO: 10.78 K/UL — HIGH (ref 3.8–10.5)

## 2022-01-11 PROCEDURE — 87186 SC STD MICRODIL/AGAR DIL: CPT

## 2022-01-11 PROCEDURE — 36415 COLL VENOUS BLD VENIPUNCTURE: CPT

## 2022-01-11 PROCEDURE — 93010 ELECTROCARDIOGRAM REPORT: CPT

## 2022-01-11 PROCEDURE — 93005 ELECTROCARDIOGRAM TRACING: CPT

## 2022-01-11 PROCEDURE — 80048 BASIC METABOLIC PNL TOTAL CA: CPT

## 2022-01-11 PROCEDURE — 86140 C-REACTIVE PROTEIN: CPT

## 2022-01-11 PROCEDURE — 85730 THROMBOPLASTIN TIME PARTIAL: CPT

## 2022-01-11 PROCEDURE — 71045 X-RAY EXAM CHEST 1 VIEW: CPT

## 2022-01-11 PROCEDURE — 85027 COMPLETE CBC AUTOMATED: CPT

## 2022-01-11 PROCEDURE — 71045 X-RAY EXAM CHEST 1 VIEW: CPT | Mod: 26

## 2022-01-11 PROCEDURE — 87077 CULTURE AEROBIC IDENTIFY: CPT

## 2022-01-11 PROCEDURE — 87070 CULTURE OTHR SPECIMN AEROBIC: CPT

## 2022-01-11 PROCEDURE — 85610 PROTHROMBIN TIME: CPT

## 2022-01-11 PROCEDURE — 99253 IP/OBS CNSLTJ NEW/EST LOW 45: CPT

## 2022-01-11 RX ORDER — SODIUM CHLORIDE 9 MG/ML
1000 INJECTION, SOLUTION INTRAVENOUS
Refills: 0 | Status: DISCONTINUED | OUTPATIENT
Start: 2022-01-11 | End: 2022-01-11

## 2022-01-11 RX ORDER — ASCORBIC ACID 60 MG
500 TABLET,CHEWABLE ORAL
Refills: 0 | Status: DISCONTINUED | OUTPATIENT
Start: 2022-01-11 | End: 2022-01-13

## 2022-01-11 RX ORDER — ACETAMINOPHEN 500 MG
650 TABLET ORAL EVERY 6 HOURS
Refills: 0 | Status: DISCONTINUED | OUTPATIENT
Start: 2022-01-11 | End: 2022-01-13

## 2022-01-11 RX ORDER — OXYCODONE HYDROCHLORIDE 5 MG/1
10 TABLET ORAL EVERY 4 HOURS
Refills: 0 | Status: DISCONTINUED | OUTPATIENT
Start: 2022-01-11 | End: 2022-01-13

## 2022-01-11 RX ORDER — PANTOPRAZOLE SODIUM 20 MG/1
40 TABLET, DELAYED RELEASE ORAL
Refills: 0 | Status: DISCONTINUED | OUTPATIENT
Start: 2022-01-11 | End: 2022-01-13

## 2022-01-11 RX ORDER — CYCLOBENZAPRINE HYDROCHLORIDE 10 MG/1
10 TABLET, FILM COATED ORAL THREE TIMES A DAY
Refills: 0 | Status: DISCONTINUED | OUTPATIENT
Start: 2022-01-11 | End: 2022-01-13

## 2022-01-11 RX ORDER — TOPIRAMATE 25 MG
2 TABLET ORAL
Qty: 0 | Refills: 0 | DISCHARGE

## 2022-01-11 RX ORDER — ACETAMINOPHEN 500 MG
2 TABLET ORAL
Qty: 0 | Refills: 0 | DISCHARGE

## 2022-01-11 RX ORDER — TOPIRAMATE 25 MG
25 TABLET ORAL DAILY
Refills: 0 | Status: DISCONTINUED | OUTPATIENT
Start: 2022-01-11 | End: 2022-01-13

## 2022-01-11 RX ORDER — ONDANSETRON 8 MG/1
4 TABLET, FILM COATED ORAL EVERY 6 HOURS
Refills: 0 | Status: DISCONTINUED | OUTPATIENT
Start: 2022-01-11 | End: 2022-01-13

## 2022-01-11 RX ORDER — RIZATRIPTAN BENZOATE 5 MG/1
1 TABLET ORAL
Qty: 0 | Refills: 0 | DISCHARGE

## 2022-01-11 RX ORDER — FOLIC ACID 0.8 MG
1 TABLET ORAL DAILY
Refills: 0 | Status: DISCONTINUED | OUTPATIENT
Start: 2022-01-11 | End: 2022-01-13

## 2022-01-11 RX ORDER — OXYCODONE HYDROCHLORIDE 5 MG/1
5 TABLET ORAL EVERY 4 HOURS
Refills: 0 | Status: DISCONTINUED | OUTPATIENT
Start: 2022-01-11 | End: 2022-01-13

## 2022-01-11 RX ADMIN — CYCLOBENZAPRINE HYDROCHLORIDE 10 MILLIGRAM(S): 10 TABLET, FILM COATED ORAL at 18:11

## 2022-01-11 RX ADMIN — SODIUM CHLORIDE 75 MILLILITER(S): 9 INJECTION, SOLUTION INTRAVENOUS at 04:01

## 2022-01-11 RX ADMIN — Medication 110 MILLIGRAM(S): at 06:33

## 2022-01-11 RX ADMIN — OXYCODONE HYDROCHLORIDE 10 MILLIGRAM(S): 5 TABLET ORAL at 18:11

## 2022-01-11 RX ADMIN — Medication 500 MILLIGRAM(S): at 22:08

## 2022-01-11 RX ADMIN — PANTOPRAZOLE SODIUM 40 MILLIGRAM(S): 20 TABLET, DELAYED RELEASE ORAL at 06:33

## 2022-01-11 RX ADMIN — OXYCODONE HYDROCHLORIDE 10 MILLIGRAM(S): 5 TABLET ORAL at 19:34

## 2022-01-11 RX ADMIN — Medication 900 MILLIGRAM(S): at 19:34

## 2022-01-11 RX ADMIN — SODIUM CHLORIDE 75 MILLILITER(S): 9 INJECTION, SOLUTION INTRAVENOUS at 08:53

## 2022-01-11 RX ADMIN — Medication 100 MILLIGRAM(S): at 19:34

## 2022-01-11 RX ADMIN — Medication 110 MILLIGRAM(S): at 18:28

## 2022-01-11 NOTE — H&P ADULT - NSHPPHYSICALEXAM_GEN_ALL_CORE
Exam:   T(C): 37.1 (01-10-22 @ 21:01), Max: 37.1 (01-10-22 @ 21:01)  T(F): 98.7 (01-10-22 @ 21:01), Max: 98.7 (01-10-22 @ 21:01)  HR: 106 (01-10-22 @ 21:01) (106 - 106)  BP: 116/77 (01-10-22 @ 21:01) (116/77 - 116/77)  RR: 18 (01-10-22 @ 21:01) (18 - 18)  SpO2: 100% (01-10-22 @ 21:01) (100% - 100%)    Gen: resting in bed, NAD  RUE:  Two puncture wounds present on the volar surface of the middle phalanx of the second digit. Swelling present of the second digit to the palm. Erythema of the digit noted.   TTP over the digit and over the flexor tendon sheath in the palm. Minimal pain with extension of the second digit. Decreased ROM at DIP and PIP of index finger 2/2 swelling.   SILT C5-T1  Ax/rad/msc/rad/med/uln/ain/pin intact.   Radial pulse palpable.  No calf tenderness bilaterally.  Compartments soft and compressible.

## 2022-01-11 NOTE — CONSULT NOTE ADULT - ATTENDING COMMENTS
pt seen and examined with NP Linda mosquera in ED. Agree with documentation as above with changes made where appropriate.   pt admitted after a cat bite with swelling of her right index finger.   admitted to ortho with probable tenosynovitis.   had full cardiac workup last year including stress test/aortic scan which was negative. no prior reactions to anesthesia. good exercise tolerance. no recent sob/chest pain/f/c/r/n/v/d/abd pain.     abx per ID.   may need OR.   no medical contraindications for OR at this time.   pain control   ivf while npo   dvt px    thank you, will follow.

## 2022-01-11 NOTE — ED ADULT NURSE REASSESSMENT NOTE - NS ED NURSE REASSESS COMMENT FT1
Pt received alert and oriented x4 VSS afebrile. Pt with ace wrap to right hand-elevated-pt had minimal relief from previous pain medication administration-pt states "I am comfortable". Pt OOB ambulating independently, gait steady. IV antibiotics infusing. All needs addressed-NO OR at this time-procedure performed in supertrack at bedside. Covid swab negative-bed assignment pending.
Pt resting comfortably in bed, pt states "My hand just feels stiff but not in pain, its tolerable right now." IVF given as ordered, vital signs stable, awaiting bed, will reassess.
Pt stable. Pt denies pain at this time. Pt requests medication to be given at a later time, possibly after ortho procedure. Pt safety and comfort measures done. Will con't to  monitor.
pt remains aox4, index finger remains swollen, kept elevated on pillow. + radial pulse. Medicated with tylenol earlier, denies need for pain med at this time. Awaiting covid results.

## 2022-01-11 NOTE — DISCHARGE NOTE PROVIDER - CARE PROVIDER_API CALL
Inderjit Ramirez)  Orthopaedic Surgery; Surgery of the Hand  [address] [place] [address]  Phone: [phone]  Fax: [phone]  Follow Up Time:

## 2022-01-11 NOTE — H&P ADULT - ASSESSMENT
56F s/p cat scratch to right index finger, r/o flexor tenosynovitis    Plan:  XR imaging reviewed without bony abnormality  Concern for possible developing flexor tenosynovitis, as patient with TTP over flexor tendon sheath, pain with passive extension, swelling of digit. However due to acute presentation with injury earlier today, will trial IV antibiotics and monitor for improvement.   Preop labs/T&S/COVID/EKG/CXR ordered  FU AM Labs/ESR/CRP/BCx  Hold DVT ppx in event that patient requires emergent operative intervention; SCDs okay  NPO except medications for possible OR; IVF while NPO  ID Consult to be placed in AM  s/p Clindamycin in ED, currently on Doxycycline; ID recs pending  Ice and Elevation to decrease swelling  Plan for observation and possible emergent irrigation and debridement pending improvement versus worsening on IV antibiotics

## 2022-01-11 NOTE — DISCHARGE NOTE PROVIDER - NSDCFUADDINST_GEN_ALL_CORE_FT
1. Keep bandage on and cover hand with plastic bag for showering.    2. If you have a splint on, keep it on until you see Dr. Ramirez in the office. Do not get the splint wet at all.    3. Elevate hand as much as possible and wiggle fingers as much as you can, as often as you think of it (wiggle 10 times every commercial  if you are watching TV, or reading a book after every 5 pages read). The exception is if you have a splint you will not be able to wiggle the affected digit. Try to wiggle the free ones though.    4. Walk plenty, no sitting around.    5. See Dr. Ramirez in the office in about 7-10 days. Call to schedule. You will have you wound checked then, any sutures will be removed, and your splint (if you have one on) will be changed.    6. Continue taking the prescribed medications as indicated.

## 2022-01-11 NOTE — DISCHARGE NOTE PROVIDER - NSDCCPTREATMENT_GEN_ALL_CORE_FT
PRINCIPAL PROCEDURE  Procedure: Incision and drainage of flexor tendon sheath  Findings and Treatment:

## 2022-01-11 NOTE — DISCHARGE NOTE PROVIDER - NSDCMRMEDTOKEN_GEN_ALL_CORE_FT
acetaminophen 325 mg oral tablet: 2 tab(s) orally every 8 hours, As Needed  cyclobenzaprine 10 mg oral tablet: 1 tab(s) orally once a day (at bedtime), As Needed   mg oral tablet: 1 tab(s) orally every 8 hours, As Needed -for moderate pain   rizatriptan 10 mg oral tablet: 1 tab(s) orally at onset of headache- May repeat x1 in 2 hours MDD:2  topiramate 25 mg oral tablet: 2 tab(s) orally once a day (at bedtime)   acetaminophen 325 mg oral tablet: 2 tab(s) orally every 8 hours, As Needed  doxycycline hyclate 100 mg oral capsule: 1 cap(s) orally 2 times a day    mg oral tablet: 1 tab(s) orally every 8 hours, As Needed -for moderate pain   rizatriptan 10 mg oral tablet: 1 tab(s) orally at onset of headache- May repeat x1 in 2 hours MDD:2  topiramate 25 mg oral tablet: 2 tab(s) orally once a day (at bedtime)

## 2022-01-11 NOTE — DISCHARGE NOTE PROVIDER - NSDCCPCAREPLAN_GEN_ALL_CORE_FT
PRINCIPAL DISCHARGE DIAGNOSIS  Diagnosis: Tenosynovitis of finger  Assessment and Plan of Treatment:

## 2022-01-11 NOTE — PHARMACOTHERAPY INTERVENTION NOTE - COMMENTS
Medication history complete, reviewed medications with patient and [facility]

## 2022-01-11 NOTE — CONSULT NOTE ADULT - ASSESSMENT
56F with potential early flexor tenosynovitis    Plan:   Given exam findings, possible that patient is in early stages of flexor tenosynovitis - will attempt conservative management with low threshold for escalation  Recommend IV abx course with clindamycin for anaerobic coverage, doxycycline for coverage of Pasturella and Bartonella   Recommend elevation of extremity, warm water soaks  Pain management PRN  NPO for potential OR I+D if clinical picture worsens/does not improve  Discussed with Dr. Ramirez, who agrees with the plan. 
56F RHD presents after cat bite injury 1/10. She was trying to put her cat in her carrier when her cat scratched her on the volar surface of her palm. She noticed pain and puncture sites over the volar surface of the index finger on the right hand but continued about her day. However, her finger began to swell throughout the day and she began to have pain in the palm proximal to her index finger. She went to urgent care at about 2000, where she was referred to come to the ED for further evaluation and management. In the ED, patient notes that the finger has become progressively more swollen. She also has pain over the area of the index flexor sheath in the palm. She is still able to bend the digits but cannot make a full fist. She notes slightly diminished sensation of the fingers at baseline 2/2 her Chiari malformation. Patient denies any fevers, chills, numbness, tingling, weakness, or any other orthopaedic complaint.     1. R index finger SSTI. probable flexor tenosynovitis. s/p cat bite injury  - has allergies to cephalosporins and pcn  - agree with IV doxycycline 100mg BID  - plan for OR for debridement f/u cultures  - f/u blood cx   - monitor temps  - tolerating abx well so far; no side effects noted  - reason for abx use and side effects reviewed with patient  - supportive care  - fu cbc    2. other issues - care per medicine

## 2022-01-11 NOTE — CONSULT NOTE ADULT - SUBJECTIVE AND OBJECTIVE BOX
Patient is a 56y old  Female who presents with a chief complaint of r/o right index finger flexor tenosynovitis (11 Jan 2022 11:05)    HPI:  56F RHD presents after cat bite injury 1/10. She was trying to put her cat in her carrier when her cat scratched her on the volar surface of her palm. She noticed pain and puncture sites over the volar surface of the index finger on the right hand but continued about her day. However, her finger began to swell throughout the day and she began to have pain in the palm proximal to her index finger. She went to urgent care at about 2000, where she was referred to come to the ED for further evaluation and management. In the ED, patient notes that the finger has become progressively more swollen. She also has pain over the area of the index flexor sheath in the palm. She is still able to bend the digits but cannot make a full fist. She notes slightly diminished sensation of the fingers at baseline 2/2 her Chiari malformation. Patient denies any fevers, chills, numbness, tingling, weakness, or any other orthopaedic complaint.       PMH: as above  PSH: as above  Meds: per reconciliation sheet, noted below  MEDICATIONS  (STANDING):  ascorbic acid 500 milliGRAM(s) Oral two times a day  doxycycline IVPB      doxycycline IVPB 100 milliGRAM(s) IV Intermittent every 12 hours  folic acid 1 milliGRAM(s) Oral daily  lactated ringers. 1000 milliLiter(s) (75 mL/Hr) IV Continuous <Continuous>  multivitamin 1 Tablet(s) Oral daily  pantoprazole    Tablet 40 milliGRAM(s) Oral before breakfast  topiramate 25 milliGRAM(s) Oral daily      Allergies    Ceclor (Hives)  penicillin (Hives)  vancomycin (Rash)    Intolerances      Social: no smoking, no alcohol, no illegal drugs; no recent travel, no exposure to TB  FAMILY HISTORY:     no history of premature cardiovascular disease in first degree relatives    ROS: the patient denies fever, no chills, no HA, no dizziness, no sore throat, no blurry vision, no CP, no palpitations, no SOB, no cough, no abdominal pain, no diarrhea, no N/V, no dysuria, no leg pain, no claudication, no rash,  no rectal pain or bleeding, no night sweats    All other systems reviewed and are negative    Vital Signs Last 24 Hrs  T(C): 36.7 (11 Jan 2022 07:31), Max: 37.1 (10 Robbin 2022 21:01)  T(F): 98.1 (11 Jan 2022 07:31), Max: 98.7 (10 Robbin 2022 21:01)  HR: 81 (11 Jan 2022 07:31) (81 - 106)  BP: 105/70 (11 Jan 2022 07:31) (102/55 - 116/77)  BP(mean): 81 (11 Jan 2022 07:31) (73 - 87)  RR: 18 (11 Jan 2022 07:31) (16 - 18)  SpO2: 99% (11 Jan 2022 07:31) (99% - 100%)  Daily Height in cm: 167.64 (10 Robbin 2022 20:57)    Daily     PE:  Constitutional: NAD   HEENT: NC/AT, EOMI, PERRLA, conjunctivae clear; ears and nose atraumatic; pharynx benign  Neck: supple; thyroid not palpable  Back: no tenderness  Respiratory: respiratory effort normal; clear to auscultation  Cardiovascular: S1S2 regular, no murmurs  Abdomen: soft, not tender, not distended, positive BS; liver and spleen WNL  Genitourinary: no suprapubic tenderness  Lymphatic: no LN palpable  Musculoskeletal: no muscle tenderness, no joint swelling or tenderness  Extremities: no pedal edema  Neurological/ Psychiatric: AxOx3, Judgement and insight normal;  moving all extremities  Skin: no rashes; no palpable lesions R index finger edema, warmth, tenderness limited ROM    Labs: all available labs reviewed                        13.5   10.78 )-----------( 275      ( 11 Jan 2022 06:00 )             39.5     01-11    140  |  107  |  13  ----------------------------<  97  3.6   |  26  |  0.58    Ca    8.8      11 Jan 2022 06:00               Radiology: all available radiological tests reviewed  < from: Xray Chest 1 View- PORTABLE-Urgent (Xray Chest 1 View- PORTABLE-Urgent .) (01.11.22 @ 00:35) >    ACC: [id number] EXAM:  XR CHEST PORTABLE URGENT 1V                          PROCEDURE DATE:  01/11/2022          INTERPRETATION:  Clinical history: 56-year-old female, preop.    Single view of the chest without comparison demonstrates a normal cardiac   silhouette and normal pulmonary vasculature with no consolidation,   effusion, gross adenopathy, pneumothorax or acute osseous finding.    IMPRESSION:    No acute radiographic findings    --- End of Report ---      < end of copied text >    Advanced directives addressed: full resuscitation

## 2022-01-11 NOTE — H&P ADULT - NSHPLABSRESULTS_GEN_ALL_CORE
14.6   15.94 )-----------( 320      ( 10 Robbin 2022 21:48 )             43.1       Preop labs/T&S/COVID/EKG/CXR ordered    ESR/CRP ordered    XR Right hand reviewed

## 2022-01-11 NOTE — PATIENT PROFILE ADULT - FALL HARM RISK - HARM RISK INTERVENTIONS

## 2022-01-11 NOTE — CONSULT NOTE ADULT - SUBJECTIVE AND OBJECTIVE BOX
PCP:  Dr Lonnie Duong    CHIEF COMPLAINT: left index pain and swelling after cat scratch/bite    HISTORY OF THE PRESENT ILLNESS: this is a 55 yo female with pmh: Ehler's -Danlos syndrome, Chiari 1 malformation. hydrocephalus, intracranial htn, MELI, mild, does not use CPAP, TMJ, Covid-19 in may, who was attempting to place her cat in a pet carrier when her pet scratched her on the volar surface of her palm, with noted puncture marks. Throughout the day she noted swelling and pain from her palm up through her left index finger. She went to urgent care and was referred to  ER. IN ER pt rec'd clindamycin and doxycycline.  She denies any f/c/r.  no drainage from wounds, unable to make a fist.  no numbness or tingling.    Pt denies any issues with anesthesia in the past.  States she walks about a mile a day, but is limited 2/2 to Chiari malformation, climbs stairs without any Cp or SOB.  Had echo and stress test a little over a year ago, at [facility]    PAST MEDICAL HISTORY: as above    PAST SURGICAL HISTORY:  [place] holes in 2018, AP, T&A, B/L knee arthroscopies, carpal tunnel    FAMILY HISTORY:   Mother alive: age 93, DM, Father Dec: age 93: Covid-19    SOCIAL HISTORY:  current every day smoker, 10-12 cigs daily x years, daily alcohol-admits to 1 drink per day, no drugs    ALLERGIES:  ceclor PCN, vancomycin    HOME MEDS: see med rec    REVIEW OF SYSTEMS:   All 10 systems reviewed in detailed and found to be negative with the exception of what has already been described above    MEDICATIONS  (STANDING):  ascorbic acid 500 milliGRAM(s) Oral two times a day  doxycycline IVPB      doxycycline IVPB 100 milliGRAM(s) IV Intermittent every 12 hours  folic acid 1 milliGRAM(s) Oral daily  lactated ringers. 1000 milliLiter(s) (75 mL/Hr) IV Continuous <Continuous>  multivitamin 1 Tablet(s) Oral daily  pantoprazole    Tablet 40 milliGRAM(s) Oral before breakfast  topiramate 25 milliGRAM(s) Oral daily    MEDICATIONS  (PRN):  acetaminophen     Tablet .. 650 milliGRAM(s) Oral every 6 hours PRN Temp greater or equal to 38C (100.4F), Mild Pain (1 - 3)  cyclobenzaprine 10 milliGRAM(s) Oral three times a day PRN Muscle Spasm  ondansetron Injectable 4 milliGRAM(s) IV Push every 6 hours PRN Nausea and/or Vomiting  oxyCODONE    IR 10 milliGRAM(s) Oral every 4 hours PRN Moderate Pain (4 - 6)  oxyCODONE    IR 5 milliGRAM(s) Oral every 4 hours PRN Mild Pain (1 - 3)      VITALS:  T(F): 98.1 (01-11-22 @ 07:31), Max: 98.7 (01-10-22 @ 21:01)  HR: 81 (01-11-22 @ 07:31) (81 - 106)  BP: 105/70 (01-11-22 @ 07:31) (102/55 - 116/77)  RR: 18 (01-11-22 @ 07:31) (16 - 18)  SpO2: 99% (01-11-22 @ 07:31) (99% - 100%)  Wt(kg): --      PHYSICAL EXAM:    GENERAL: Comfortable, no acute distress   HEAD:  Normocephalic, atraumatic  EYES: EOMI, PERRLA  HEENT: Moist mucous membranes  NECK: Supple, No JVD  NERVOUS SYSTEM:  Alert & Oriented X3, Motor Strength 5/5 B/L upper and lower extremities  CHEST/LUNG: Clear to auscultation bilaterally  HEART: Regular rate and rhythm  ABDOMEN: Soft, non tender, Nondistended, Bowel sounds present  GENITOURINARY: Voiding, no palpable bladder  EXTREMITIES:   No clubbing, cyanosis, or edema  MUSCULOSKELETAL- left hand with + swelling, puncture sites noted on distal left index finger, no drainage  SKIN-no rash       LABS:                        13.5   10.78 )-----------( 275      ( 11 Jan 2022 06:00 )             39.5     01-11    140  |  107  |  13  ----------------------------<  97  3.6   |  26  |  0.58    Ca    8.8      11 Jan 2022 06:00            PT/INR - ( 11 Jan 2022 06:00 )   PT: 12.1 sec;   INR: 1.04 ratio         PTT - ( 11 Jan 2022 06:00 )  PTT:31.1 sec        EKG: NSR, reviewed by med    RADIOLOGY STUDIES: pending      IMPRESSION:  55 yo female with above pmh a/w:  # cat scratch/bite to left index finger  # R/o Flexor tenosynivitis  adm under ortho  pain control  cont IV  abxs: clinda, doxy  poss OR for I&D if symptoms worsen  ID consult  pt is medically optimized for possible surgery. pt has a verly low 0.4 % risk of adverse cardiac outcomes per the revised cardiac  risk index    # H/o Ehler's-Dnlos syndrome/ Chiari malformation  Noted  cont home meds    #MELI, mild  does not use Cpap at home  monitor O2 sats  supplement with O2 prn    Pt has completed the covid-19 vaccine series with Moderna in May 2021    Thank you for the consult, will follow with you           PCP:  Dr Lonnie Duong    CHIEF COMPLAINT: right  index pain and swelling after cat scratch/bite    HPI: 55 yo female with pmh of  Ehler's -Danlos syndrome, Chiari 1 malformation. Hydrocephalus,  MELI, mild, does not use CPAP, TMJ, Covid-19 in may, who was attempting to place her cat in a pet carrier when her pet scratched her on the volar surface of her palm, with noted puncture marks. Throughout the day she noted swelling and pain from her palm up through her left index finger. She went to urgent care and was referred to  ER. IN ER pt rec'd clindamycin and doxycycline.  She denies any f/c/r.  no drainage from wounds, unable to make a fist.  no numbness or tingling.    Pt denies any issues with anesthesia in the past.  States she walks about a mile a day, but is limited 2/2 to Chiari malformation, climbs stairs without any Cp or SOB.  Had echo and stress test a little over a year ago, at [facility]    PAST MEDICAL HISTORY: as above    PAST SURGICAL HISTORY:  [place] holes in 2018, AP, T&A, B/L knee arthroscopies, carpal tunnel    FAMILY HISTORY:   Mother alive: age 93, DM, Father Dec: age 93: Covid-19    SOCIAL HISTORY:  current every day smoker, 10-12 cigs daily x years, daily alcohol-admits to 1 drink per day, no drugs    ALLERGIES:  ceclor PCN, vancomycin    HOME MEDS: see med rec    REVIEW OF SYSTEMS:   All 10 systems reviewed in detailed and found to be negative with the exception of what has already been described above    MEDICATIONS  (STANDING):  ascorbic acid 500 milliGRAM(s) Oral two times a day  doxycycline IVPB      doxycycline IVPB 100 milliGRAM(s) IV Intermittent every 12 hours  folic acid 1 milliGRAM(s) Oral daily  lactated ringers. 1000 milliLiter(s) (75 mL/Hr) IV Continuous <Continuous>  multivitamin 1 Tablet(s) Oral daily  pantoprazole    Tablet 40 milliGRAM(s) Oral before breakfast  topiramate 25 milliGRAM(s) Oral daily    MEDICATIONS  (PRN):  acetaminophen     Tablet .. 650 milliGRAM(s) Oral every 6 hours PRN Temp greater or equal to 38C (100.4F), Mild Pain (1 - 3)  cyclobenzaprine 10 milliGRAM(s) Oral three times a day PRN Muscle Spasm  ondansetron Injectable 4 milliGRAM(s) IV Push every 6 hours PRN Nausea and/or Vomiting  oxyCODONE    IR 10 milliGRAM(s) Oral every 4 hours PRN Moderate Pain (4 - 6)  oxyCODONE    IR 5 milliGRAM(s) Oral every 4 hours PRN Mild Pain (1 - 3)      VITALS:  T(F): 98.1 (01-11-22 @ 07:31), Max: 98.7 (01-10-22 @ 21:01)  HR: 81 (01-11-22 @ 07:31) (81 - 106)  BP: 105/70 (01-11-22 @ 07:31) (102/55 - 116/77)  RR: 18 (01-11-22 @ 07:31) (16 - 18)  SpO2: 99% (01-11-22 @ 07:31) (99% - 100%)        PHYSICAL EXAM:    GENERAL: Comfortable, no acute distress   HEAD:  Normocephalic, atraumatic  EYES: EOMI, PERRLA  HEENT: Moist mucous membranes  NECK: Supple, No JVD  NERVOUS SYSTEM:  Alert & Oriented X3, Motor Strength 5/5 B/L upper and lower extremities  CHEST/LUNG: Clear to auscultation bilaterally  HEART: Regular rate and rhythm  ABDOMEN: Soft, non tender, Nondistended, Bowel sounds present  GENITOURINARY: Voiding, no palpable bladder  EXTREMITIES:   No clubbing, cyanosis, or edema  MUSCULOSKELETAL- right hand with + swelling, puncture sites noted on distal right index finger, no drainage  SKIN-no rash       LABS:                        13.5   10.78 )-----------( 275      ( 11 Jan 2022 06:00 )             39.5     01-11    140  |  107  |  13  ----------------------------<  97  3.6   |  26  |  0.58    Ca    8.8      11 Jan 2022 06:00            PT/INR - ( 11 Jan 2022 06:00 )   PT: 12.1 sec;   INR: 1.04 ratio         PTT - ( 11 Jan 2022 06:00 )  PTT:31.1 sec        EKG: NSR, reviewed by me     Xray Hand 3 Views, Right (01.10.22 @ 22:08) >  Three views of the right hand without comparison demonstrate no fracture,   dislocation, significant degenerative change or radiopaque foreign body.    IMPRESSION:  Negative radiographs        IMPRESSION:  55 yo female with above pmh a/w:    # cat scratch/bite to right index finger  # R/o Flexor tenosynivitis  admitted under ortho  pain control  cont doxy  poss OR for I&D if symptoms worsen  ID consult  pt is medically optimized for possible surgery. pt has a verly low 0.4 % risk of adverse cardiac outcomes per the revised cardiac  risk index    # H/o Ehler's-Dnlos syndrome/ Chiari malformation  Noted  cont home meds    #MELI, mild  does not use Cpap at home  monitor O2 sats  supplement with O2 prn    Pt has completed the covid-19 vaccine series with Moderna in May 2021    Thank you for the consult, will follow with you

## 2022-01-11 NOTE — DISCHARGE NOTE PROVIDER - HOSPITAL COURSE
56F FELI presents after cat bite injury today at noon. She was trying to put her cat in her carrier when her cat scratched her on the volar surface of her palm. She noticed pain and puncture sites over the volar surface of the index finger on the right hand but continued about her day. However, her finger began to swell throughout the day and she began to have pain in the palm proximal to her index finger. She went to urgent care at about 2000, where she was referred to come to the ED for further evaluation and management. In the ED, patient noted that the finger has become progressively more swollen. She also had pain over the area of the index flexor sheath in the palm. She was still able to bend the digits but could not make a full fist. She had slightly diminished sensation of the fingers at baseline 2/2 her Chiari malformation. Patient denied any fevers, chills, numbness, tingling, weakness, or any other orthopaedic complaint.     She was determined to have an early flexor tenosynovitis of the right index finger and started on a trial of conservative management of antibiotics, warm water soaks, and elevation. On the morning of 1/11, patient's condition did not improve and her flexor tenosynovitis picture progressed. A bedside incision and drainage was performed, and an ID consult was called. The patient was subsequently monitored for clinical improvement. By discharge, the patient's condition had improved and an outpatient antibiotic regimen was recommended by ID.

## 2022-01-11 NOTE — H&P ADULT - HISTORY OF PRESENT ILLNESS
56F FELI presents after cat bite injury today at noon. She was trying to put her cat in her carrier when her cat scratched her on the volar surface of her palm. She noticed pain and puncture sites over the volar surface of the index finger on the right hand but continued about her day. However, her finger began to swell throughout the day and she began to have pain in the palm proximal to her index finger. She went to urgent care at about 2000, where she was referred to come to the ED for further evaluation and management. In the ED, patient notes that the finger has become progressively more swollen. She also has pain over the area of the index flexor sheath in the palm. She is still able to bend the digits but cannot make a full fist. She notes slightly diminished sensation of the fingers at baseline 2/2 her Chiari malformation. Patient denies any fevers, chills, numbness, tingling, weakness, or any other orthopaedic complaint.

## 2022-01-11 NOTE — PATIENT PROFILE ADULT - STATED REASON FOR ADMISSION
cat scratch 12 n yesterday went to urgent care at 1900 and sent to md at 2000, within hr or 2 hand blew up after scratch.

## 2022-01-12 LAB — CRP SERPL-MCNC: 19 MG/L — HIGH

## 2022-01-12 PROCEDURE — 99232 SBSQ HOSP IP/OBS MODERATE 35: CPT

## 2022-01-12 RX ADMIN — Medication 1 TABLET(S): at 10:25

## 2022-01-12 RX ADMIN — Medication 1 MILLIGRAM(S): at 10:25

## 2022-01-12 RX ADMIN — Medication 110 MILLIGRAM(S): at 05:47

## 2022-01-12 RX ADMIN — PANTOPRAZOLE SODIUM 40 MILLIGRAM(S): 20 TABLET, DELAYED RELEASE ORAL at 06:48

## 2022-01-12 RX ADMIN — Medication 500 MILLIGRAM(S): at 10:25

## 2022-01-12 RX ADMIN — Medication 500 MILLIGRAM(S): at 22:50

## 2022-01-12 RX ADMIN — Medication 110 MILLIGRAM(S): at 17:49

## 2022-01-12 RX ADMIN — OXYCODONE HYDROCHLORIDE 10 MILLIGRAM(S): 5 TABLET ORAL at 01:33

## 2022-01-12 RX ADMIN — OXYCODONE HYDROCHLORIDE 10 MILLIGRAM(S): 5 TABLET ORAL at 02:15

## 2022-01-12 RX ADMIN — Medication 25 MILLIGRAM(S): at 10:25

## 2022-01-13 ENCOUNTER — TRANSCRIPTION ENCOUNTER (OUTPATIENT)
Age: 57
End: 2022-01-13

## 2022-01-13 VITALS
SYSTOLIC BLOOD PRESSURE: 113 MMHG | RESPIRATION RATE: 18 BRPM | HEART RATE: 98 BPM | OXYGEN SATURATION: 96 % | DIASTOLIC BLOOD PRESSURE: 72 MMHG | TEMPERATURE: 98 F

## 2022-01-13 LAB
ANION GAP SERPL CALC-SCNC: 3 MMOL/L — LOW (ref 5–17)
BUN SERPL-MCNC: 13 MG/DL — SIGNIFICANT CHANGE UP (ref 7–23)
CALCIUM SERPL-MCNC: 8.7 MG/DL — SIGNIFICANT CHANGE UP (ref 8.5–10.1)
CHLORIDE SERPL-SCNC: 110 MMOL/L — HIGH (ref 96–108)
CO2 SERPL-SCNC: 26 MMOL/L — SIGNIFICANT CHANGE UP (ref 22–31)
CREAT SERPL-MCNC: 0.6 MG/DL — SIGNIFICANT CHANGE UP (ref 0.5–1.3)
GLUCOSE SERPL-MCNC: 105 MG/DL — HIGH (ref 70–99)
HCT VFR BLD CALC: 37.7 % — SIGNIFICANT CHANGE UP (ref 34.5–45)
HGB BLD-MCNC: 12.8 G/DL — SIGNIFICANT CHANGE UP (ref 11.5–15.5)
MCHC RBC-ENTMCNC: 33.9 PG — SIGNIFICANT CHANGE UP (ref 27–34)
MCHC RBC-ENTMCNC: 34 GM/DL — SIGNIFICANT CHANGE UP (ref 32–36)
MCV RBC AUTO: 99.7 FL — SIGNIFICANT CHANGE UP (ref 80–100)
PLATELET # BLD AUTO: 262 K/UL — SIGNIFICANT CHANGE UP (ref 150–400)
POTASSIUM SERPL-MCNC: 3.7 MMOL/L — SIGNIFICANT CHANGE UP (ref 3.5–5.3)
POTASSIUM SERPL-SCNC: 3.7 MMOL/L — SIGNIFICANT CHANGE UP (ref 3.5–5.3)
RBC # BLD: 3.78 M/UL — LOW (ref 3.8–5.2)
RBC # FLD: 12.2 % — SIGNIFICANT CHANGE UP (ref 10.3–14.5)
SODIUM SERPL-SCNC: 139 MMOL/L — SIGNIFICANT CHANGE UP (ref 135–145)
WBC # BLD: 7.32 K/UL — SIGNIFICANT CHANGE UP (ref 3.8–10.5)
WBC # FLD AUTO: 7.32 K/UL — SIGNIFICANT CHANGE UP (ref 3.8–10.5)

## 2022-01-13 RX ADMIN — PANTOPRAZOLE SODIUM 40 MILLIGRAM(S): 20 TABLET, DELAYED RELEASE ORAL at 06:17

## 2022-01-13 RX ADMIN — Medication 110 MILLIGRAM(S): at 06:18

## 2022-01-13 NOTE — DISCHARGE NOTE NURSING/CASE MANAGEMENT/SOCIAL WORK - NSDCPEFALRISK_GEN_ALL_CORE
For information on Fall & Injury Prevention, visit: [email or web address] OR  [email or web address] OR  https://www.cdc.gov/steadi/patient.html

## 2022-01-13 NOTE — PROGRESS NOTE ADULT - ASSESSMENT
56F RHD presents after cat bite injury 1/10. She was trying to put her cat in her carrier when her cat scratched her on the volar surface of her palm. She noticed pain and puncture sites over the volar surface of the index finger on the right hand but continued about her day. However, her finger began to swell throughout the day and she began to have pain in the palm proximal to her index finger. She went to urgent care at about 2000, where she was referred to come to the ED for further evaluation and management. In the ED, patient notes that the finger has become progressively more swollen. She also has pain over the area of the index flexor sheath in the palm. She is still able to bend the digits but cannot make a full fist. She notes slightly diminished sensation of the fingers at baseline 2/2 her Chiari malformation. Patient denies any fevers, chills, numbness, tingling, weakness, or any other orthopaedic complaint.     1. R index finger SSTI. flexor tenosynovitis. s/p cat bite injury  - has allergies to cephalosporins and pcn  - on IV doxycycline 100mg BID #2  - continue with abx coverage   - s/p debridement f/u wound cultures  - blood cultures no growth   - monitor temps  - tolerating abx well so far; no side effects noted  - reason for abx use and side effects reviewed with patient  - supportive care  - fu cbc    2. other issues - care per medicine 
56F RHD presents after cat bite injury 1/10. She was trying to put her cat in her carrier when her cat scratched her on the volar surface of her palm. She noticed pain and puncture sites over the volar surface of the index finger on the right hand but continued about her day. However, her finger began to swell throughout the day and she began to have pain in the palm proximal to her index finger. She went to urgent care at about 2000, where she was referred to come to the ED for further evaluation and management. In the ED, patient notes that the finger has become progressively more swollen. She also has pain over the area of the index flexor sheath in the palm. She is still able to bend the digits but cannot make a full fist. She notes slightly diminished sensation of the fingers at baseline 2/2 her Chiari malformation. Patient denies any fevers, chills, numbness, tingling, weakness, or any other orthopaedic complaint.     1. R index finger SSTI. flexor tenosynovitis. s/p cat bite injury  - has allergies to cephalosporins and pcn  - on IV doxycycline 100mg BID #3   - continue with abx coverage   - s/p debridement f/u wound cultures  - blood cultures no growth   - monitor temps  - tolerating abx well so far; no side effects noted  - reason for abx use and side effects reviewed with patient  - dc with po doxycycline 100mg BID 14 day course  - will tailor abx regimen based on cx results once back  - supportive care  - fu cbc    2. other issues - care per medicine 
56F s/p cat scratch to right index finger, r/o flexor tenosynovitis    Plan:  Concern for possible developing flexor tenosynovitis, as patient with TTP over flexor tendon sheath, pain with passive extension, swelling of digit. However due to acute presentation with injury earlier today, will trial IV antibiotics and monitor for improvement.   Hold DVT ppx in event that patient requires emergent operative intervention; SCDs okay  NPO except medications for possible OR; IVF while NPO  ID Consult to be placed in AM  s/p Clindamycin in ED, currently on Doxycycline; ID recs pending  Ice and Elevation to decrease swelling  Plan for observation and possible emergent irrigation and debridement pending improvement versus worsening on IV antibiotics

## 2022-01-13 NOTE — PROGRESS NOTE ADULT - REASON FOR ADMISSION
r/o right index finger flexor tenosynovitis

## 2022-01-13 NOTE — PROGRESS NOTE ADULT - SUBJECTIVE AND OBJECTIVE BOX
PCP:  Dr Lonnie Duong    CHIEF COMPLAINT: right  index pain and swelling after cat scratch/bite    HPI: 57 yo female with pmh of  Ehler's -Danlos syndrome, Chiari 1 malformation. Hydrocephalus,  MELI, mild, does not use CPAP, TMJ, Covid-19 in may, who was attempting to place her cat in a pet carrier when her pet scratched her on the volar surface of her palm, with noted puncture marks. Throughout the day she noted swelling and pain from her palm up through her left index finger. She went to urgent care and was referred to  ER. IN ER pt rec'd clindamycin and doxycycline.  She denies any f/c/r.  no drainage from wounds, unable to make a fist.  no numbness or tingling.    Pt denies any issues with anesthesia in the past.  States she walks about a mile a day, but is limited 2/2 to Chiari malformation, climbs stairs without any Cp or SOB.  Had echo and stress test a little over a year ago, at [facility]    1/12/21: pt seen at bedside, has less pain and more ROM in right hand, feels good, annamaria po, no cp or sob    REVIEW OF SYSTEMS:   All 10 systems reviewed in detailed and found to be negative with the exception of what has already been described above    MEDICATIONS  (STANDING):  ascorbic acid 500 milliGRAM(s) Oral two times a day  doxycycline IVPB      doxycycline IVPB 100 milliGRAM(s) IV Intermittent every 12 hours  folic acid 1 milliGRAM(s) Oral daily  multivitamin 1 Tablet(s) Oral daily  pantoprazole    Tablet 40 milliGRAM(s) Oral before breakfast  topiramate 25 milliGRAM(s) Oral daily    MEDICATIONS  (PRN):  acetaminophen     Tablet .. 650 milliGRAM(s) Oral every 6 hours PRN Temp greater or equal to 38C (100.4F), Mild Pain (1 - 3)  cyclobenzaprine 10 milliGRAM(s) Oral three times a day PRN Muscle Spasm  ondansetron Injectable 4 milliGRAM(s) IV Push every 6 hours PRN Nausea and/or Vomiting  oxyCODONE    IR 10 milliGRAM(s) Oral every 4 hours PRN Moderate Pain (4 - 6)  oxyCODONE    IR 5 milliGRAM(s) Oral every 4 hours PRN Mild Pain (1 - 3)    Vital Signs Last 24 Hrs  T(C): 37.2 (01-12-22 @ 08:50), Max: 37.2 (01-12-22 @ 08:50)  T(F): 99 (01-12-22 @ 08:50), Max: 99 (01-12-22 @ 08:50)  HR: 88 (01-12-22 @ 08:50) (82 - 88)  BP: 112/66 (01-12-22 @ 08:50) (110/73 - 113/69)  BP(mean): 78 (01-11-22 @ 20:00) (78 - 82)  RR: 18 (01-12-22 @ 08:50) (17 - 18)  SpO2: 99% (01-12-22 @ 08:50) (99% - 100%)    PHYSICAL EXAM:    GENERAL: Comfortable, no acute distress   HEAD:  Normocephalic, atraumatic  EYES: EOMI, PERRLA  HEENT: Moist mucous membranes  NECK: Supple, No JVD  NERVOUS SYSTEM:  Alert & Oriented X3, Motor Strength 5/5 B/L upper and lower extremities  CHEST/LUNG: Clear to auscultation bilaterally  HEART: Regular rate and rhythm  ABDOMEN: Soft, non tender, Nondistended, Bowel sounds present  GENITOURINARY: Voiding, no palpable bladder  EXTREMITIES:   No clubbing, cyanosis, or edema  MUSCULOSKELETAL- right hand with less swelling, INC ROM, dsg intact  SKIN-no rash       LABS:                                             13.5   10.78 )-----------( 275      ( 11 Jan 2022 06:00 )             39.5       01-11    140  |  107  |  13  ----------------------------<  97  3.6   |  26  |  0.58    Ca    8.8      11 Jan 2022 06:00        PT/INR - ( 11 Jan 2022 06:00 )   PT: 12.1 sec;   INR: 1.04 ratio         PTT - ( 11 Jan 2022 06:00 )  PTT:31.1 sec    EKG: NSR, reviewed by me     Xray Hand 3 Views, Right (01.10.22 @ 22:08) >  Three views of the right hand without comparison demonstrate no fracture,   dislocation, significant degenerative change or radiopaque foreign body.    IMPRESSION:  Negative radiographs        IMPRESSION:  57 yo female with above pmh a/w:    # cat scratch/bite to right index finger  # R/o Flexor tenosynivitis  admitted under ortho  pain control  cont doxy  poss OR for I&D if symptoms worsen  ID consult appreciated on doxycycline  blood culture with NGTD   if surgery warranted: pt is medically optimized for possible surgery. pt has a very low 0.4 % risk of adverse cardiac outcomes per the revised cardiac  risk index    # H/o Ehler's-Dnlos syndrome/ Chiari malformation  Noted  cont home meds    #MELI, mild  does not use Cpap at home  monitor O2 sats  supplement with O2 prn    Pt has completed the covid-19 vaccine series with Modern[employer] in May 2021  dispo: plans for d/c home eventually        
Orthopedics     Patient seen and examined at bedside, resting comfortably. No acute events overnight. Patient feeling well. Pt reports pain has improved and now only feels some tightness with ROM. ROM has significantly improved as well.  Denies CP/SOB. No nausea or vomiting. No other acute complaints at this time    Vital Signs Last 24 Hrs  T(C): 37.1 (01-13-22 @ 06:06), Max: 37.3 (01-12-22 @ 16:38)  T(F): 98.8 (01-13-22 @ 06:06), Max: 99.2 (01-12-22 @ 16:38)  HR: 99 (01-13-22 @ 06:06) (88 - 99)  BP: 101/54 (01-13-22 @ 06:06) (98/57 - 112/66)  BP(mean): 67 (01-12-22 @ 17:35) (67 - 67)  RR: 18 (01-13-22 @ 06:06) (17 - 18)  SpO2: 99% (01-13-22 @ 06:06) (97% - 100%)      Exam:  Gen: NAD, Awake and alert  RIGHT Upper Extremity:   Dry dressing c/d/i removed dressing for eval and new dressing applied  Incisions clean and dry. No active drainage or expressible fluid.  Swelling & erythema improving  Improved A/PROM  No TTP over the flexor sheath  No pain/tenderness with ROM or palpation  C5-T1 SILT  +axillary/musculocutaneous/radial/median/ulnar/AIN/PIN  + radial pulse  Compartments soft and compressible      A/P:  Patient is a 56y Female w/ R Hand index flexor teno s/p bedside I&D 1/11/22. Exam markedly improved today.    -FU Wound cultures  -Med comanagement appreciated  -ID recs appreciated  -c/w IV ABX  -Betadine+H2O soaks for 15 minutes BID then apply dry dressings  -FU am labs  -Ice/Elevate  -Incentive Spirometry  -Multimodal Analgesia  -SCDs  -PT/OT OOB   -RUE ROM as tolerated in dry dressings - encourage early active motion  -Plan for discharge home with abx today.  -Recommend FU outpatient with Dr. Ramirez next week. Call office to schedule appointment.  -Will discuss w/ attending and advise if plan changes  
Date of service: 01-13-22 @ 11:11    pt seen and examined  feels better  s/p I & D of R finger  R finger less redness, swelling  improving    ROS: no fever or chills; denies dizziness, no HA, no SOB or cough, no abdominal pain, no diarrhea or constipation; no dysuria, no urinary frequency, no legs pain, no rashes    MEDICATIONS  (STANDING):  ascorbic acid 500 milliGRAM(s) Oral two times a day  doxycycline IVPB      doxycycline IVPB 100 milliGRAM(s) IV Intermittent every 12 hours  folic acid 1 milliGRAM(s) Oral daily  multivitamin 1 Tablet(s) Oral daily  pantoprazole    Tablet 40 milliGRAM(s) Oral before breakfast  topiramate 25 milliGRAM(s) Oral daily    Vital Signs Last 24 Hrs  T(C): 36.7 (13 Jan 2022 09:47), Max: 37.3 (12 Jan 2022 16:38)  T(F): 98 (13 Jan 2022 09:47), Max: 99.2 (12 Jan 2022 16:38)  HR: 98 (13 Jan 2022 09:47) (90 - 99)  BP: 113/72 (13 Jan 2022 09:47) (98/57 - 113/72)  BP(mean): 67 (12 Jan 2022 17:35) (67 - 67)  RR: 18 (13 Jan 2022 09:47) (17 - 18)  SpO2: 96% (13 Jan 2022 09:47) (96% - 100%)    PE:  Constitutional: NAD   HEENT: NC/AT, EOMI, PERRLA, conjunctivae clear; ears and nose atraumatic; pharynx benign  Neck: supple; thyroid not palpable  Back: no tenderness  Respiratory: respiratory effort normal; clear to auscultation  Cardiovascular: S1S2 regular, no murmurs  Abdomen: soft, not tender, not distended, positive BS; liver and spleen WNL  Genitourinary: no suprapubic tenderness  Lymphatic: no LN palpable  Musculoskeletal: no muscle tenderness, no joint swelling or tenderness  Extremities: no pedal edema  Neurological/ Psychiatric: AxOx3, Judgement and insight normal;  moving all extremities  Skin: no rashes; no palpable lesions R index finger resolving edema, warmth, tenderness improving ROM    Labs: all available labs reviewed                                   12.8   7.32  )-----------( 262      ( 13 Jan 2022 07:28 )             37.7     01-13    139  |  110<H>  |  13  ----------------------------<  105<H>  3.7   |  26  |  0.60    Ca    8.7      13 Jan 2022 07:28      Culture - Blood (01.10.22 @ 21:48)   Specimen Source: .Blood None   Culture Results:   No growth to date.   Culture - Blood (01.10.22 @ 21:48)   Specimen Source: .Blood None   Culture Results:   No growth to date.    Radiology: all available radiological tests reviewed      ACC: [id number] EXAM:  XR CHEST PORTABLE URGENT 1V                          PROCEDURE DATE:  01/11/2022          INTERPRETATION:  Clinical history: 56-year-old female, preop.    Single view of the chest without comparison demonstrates a normal cardiac   silhouette and normal pulmonary vasculature with no consolidation,   effusion, gross adenopathy, pneumothorax or acute osseous finding.    IMPRESSION:    No acute radiographic findings    --- End of Report ---      < end of copied text >    Advanced directives addressed: full resuscitation
Orthopedics    Patient seen and examined at bedside, resting comfortably. No acute events overnight. Patient feeling well. Pain adequately controlled. Pt reports pain & ROM has significantly improved overnight. Denies CP/SOB. No nausea or vomiting. No other acute complaints at this time    Vital Signs Last 24 Hrs  T(C): 36.7 (01-11-22 @ 20:00), Max: 36.9 (01-11-22 @ 15:22)  T(F): 98.1 (01-11-22 @ 20:00), Max: 98.4 (01-11-22 @ 15:22)  HR: 82 (01-11-22 @ 20:00) (80 - 83)  BP: 113/69 (01-11-22 @ 20:00) (105/70 - 113/69)  BP(mean): 78 (01-11-22 @ 20:00) (78 - 82)  RR: 17 (01-11-22 @ 20:00) (16 - 18)  SpO2: 100% (01-11-22 @ 20:00) (99% - 100%)                        13.5   10.78 )-----------( 275      ( 11 Jan 2022 06:00 )             39.5     11 Jan 2022 06:00    140    |  107    |  13     ----------------------------<  97     3.6     |  26     |  0.58     Ca    8.8        11 Jan 2022 06:00      PT/INR - ( 11 Jan 2022 06:00 )   PT: 12.1 sec;   INR: 1.04 ratio         PTT - ( 11 Jan 2022 06:00 )  PTT:31.1 sec    Exam:  Gen: NAD, Awake and alert  RIGHT Upper Extremity:   Dry dressing c/d/i removed dressing for eval and new dressing applied  Incisions clean and dry. No active drainage or expressible fluid.  Swelling & erythema improving  Improved A/PROM  No TTP over the flexor sheath  Pain significantly improved  C5-T1 SILT  +axillary/musculocutaneous/radial/median/ulnar/AIN/PIN  + radial pulse  Compartments soft and compressible      A/P:  Patient is a 56y Female w/ R Hand index flexor teno s/p bedside I&D 1/11/22. Exam markedly improved today.    -FU Wound cultures  -Med comanagement appreciated  -ID recs appreciated  -c/w IV ABX  -Betadine+H2O soaks for 15 minutes BID then apply dry dressings  -FU am labs  -Ice/Elevate  -Incentive Spirometry  -Multimodal Analgesia  -SCDs  -PT/OT OOB   -RUE ROM as tolerated in dry dressings - encourage early active motion  -Will discuss w/ attending and advise if plan changes
Date of service: 01-12-22 @ 10:00    pt seen and examined  feels better  s/p I & D of R finger yesterday  R finger less redness, swelling    ROS: no fever or chills; denies dizziness, no HA, no SOB or cough, no abdominal pain, no diarrhea or constipation; no dysuria, no urinary frequency, no legs pain, no rashes    MEDICATIONS  (STANDING):  ascorbic acid 500 milliGRAM(s) Oral two times a day  doxycycline IVPB      doxycycline IVPB 100 milliGRAM(s) IV Intermittent every 12 hours  folic acid 1 milliGRAM(s) Oral daily  multivitamin 1 Tablet(s) Oral daily  pantoprazole    Tablet 40 milliGRAM(s) Oral before breakfast  topiramate 25 milliGRAM(s) Oral daily    Vital Signs Last 24 Hrs  T(C): 37.2 (12 Jan 2022 08:50), Max: 37.2 (12 Jan 2022 08:50)  T(F): 99 (12 Jan 2022 08:50), Max: 99 (12 Jan 2022 08:50)  HR: 88 (12 Jan 2022 08:50) (80 - 88)  BP: 112/66 (12 Jan 2022 08:50) (107/65 - 113/69)  BP(mean): 78 (11 Jan 2022 20:00) (78 - 82)  RR: 18 (12 Jan 2022 08:50) (16 - 18)  SpO2: 99% (12 Jan 2022 08:50) (99% - 100%)      PE:  Constitutional: NAD   HEENT: NC/AT, EOMI, PERRLA, conjunctivae clear; ears and nose atraumatic; pharynx benign  Neck: supple; thyroid not palpable  Back: no tenderness  Respiratory: respiratory effort normal; clear to auscultation  Cardiovascular: S1S2 regular, no murmurs  Abdomen: soft, not tender, not distended, positive BS; liver and spleen WNL  Genitourinary: no suprapubic tenderness  Lymphatic: no LN palpable  Musculoskeletal: no muscle tenderness, no joint swelling or tenderness  Extremities: no pedal edema  Neurological/ Psychiatric: AxOx3, Judgement and insight normal;  moving all extremities  Skin: no rashes; no palpable lesions R index finger resolving edema, warmth, tenderness improving ROM    Labs: all available labs reviewed                                   13.5   10.78 )-----------( 275      ( 11 Jan 2022 06:00 )             39.5     01-11    140  |  107  |  13  ----------------------------<  97  3.6   |  26  |  0.58    Ca    8.8      11 Jan 2022 06:00    Culture - Blood (01.10.22 @ 21:48)   Specimen Source: .Blood None   Culture Results:   No growth to date.   Culture - Blood (01.10.22 @ 21:48)   Specimen Source: .Blood None   Culture Results:   No growth to date.    Radiology: all available radiological tests reviewed      ACC: [id number] EXAM:  XR CHEST PORTABLE URGENT 1V                          PROCEDURE DATE:  01/11/2022          INTERPRETATION:  Clinical history: 56-year-old female, preop.    Single view of the chest without comparison demonstrates a normal cardiac   silhouette and normal pulmonary vasculature with no consolidation,   effusion, gross adenopathy, pneumothorax or acute osseous finding.    IMPRESSION:    No acute radiographic findings    --- End of Report ---      < end of copied text >    Advanced directives addressed: full resuscitation
Patient seen and examined at bedside. Pain about the same as last night, controlled with medications. Does not feel like swelling has decreased and feels like pain has continued in the palm. Patient denies any numbness, tingling, weakness, or any other orthopaedic complaint.     Exam:   T(C): 36.8 (01-11-22 @ 06:22), Max: 37.1 (01-10-22 @ 21:01)  T(F): 98.3 (01-11-22 @ 06:22), Max: 98.7 (01-10-22 @ 21:01)  HR: 86 (01-11-22 @ 06:22) (86 - 106)  BP: 102/55 (01-11-22 @ 06:22) (102/55 - 116/77)  RR: 16 (01-11-22 @ 06:22) (16 - 18)  SpO2: 100% (01-11-22 @ 06:22) (100% - 100%)    Gen: resting in bed, NAD  RUE:  Two puncture wounds present on the volar surface of the middle phalanx of the second digit. Swelling present of the second digit to the palm. Erythema of the digit noted.   TTP over the digit and over the flexor tendon sheath in the palm. Increased ain with extension of the second digit. Decreased ROM at DIP and PIP of index finger 2/2 swelling.   SILT C5-T1  Ax/rad/msc/rad/med/uln/ain/pin intact.   Radial pulse palpable.  No calf tenderness bilaterally.  Compartments soft and compressible.     Laboratory Results:   CBC, 01-11-22 @ 06:00    WBC: 10.78  Hgb: 13.5  Hct: 39.5  Plt: 275      Chemistry, 01-11-22 @ 06:00    Na: 140     AST: --  K: 3.6       ALT: --  Cl: 107      TProt: --  CO2: 26     Alb: --  BUN: 13     TBili: --  Cr: 0.58      AP: --  Glu: 97       Mg: --  P: --    eGFR: 121  eGFR, AA: 104

## 2022-01-13 NOTE — DISCHARGE NOTE NURSING/CASE MANAGEMENT/SOCIAL WORK - PATIENT PORTAL LINK FT
You can access the FollowMyHealth Patient Portal offered by [facility] by registering at the following website: [email or web address]. By joining [employer]’s FollowMyHealth portal, you will also be able to view your health information using other applications (apps) compatible with our system.

## 2022-01-14 LAB
-  AMPICILLIN/SULBACTAM: SIGNIFICANT CHANGE UP
-  CEFAZOLIN: SIGNIFICANT CHANGE UP
-  CLINDAMYCIN: SIGNIFICANT CHANGE UP
-  ERYTHROMYCIN: SIGNIFICANT CHANGE UP
-  GENTAMICIN: SIGNIFICANT CHANGE UP
-  OXACILLIN: SIGNIFICANT CHANGE UP
-  PENICILLIN: SIGNIFICANT CHANGE UP
-  RIFAMPIN: SIGNIFICANT CHANGE UP
-  TETRACYCLINE: SIGNIFICANT CHANGE UP
-  TRIMETHOPRIM/SULFAMETHOXAZOLE: SIGNIFICANT CHANGE UP
-  VANCOMYCIN: SIGNIFICANT CHANGE UP
METHOD TYPE: SIGNIFICANT CHANGE UP

## 2022-01-16 LAB
CULTURE RESULTS: SIGNIFICANT CHANGE UP
CULTURE RESULTS: SIGNIFICANT CHANGE UP
SPECIMEN SOURCE: SIGNIFICANT CHANGE UP
SPECIMEN SOURCE: SIGNIFICANT CHANGE UP

## 2022-01-17 LAB
-  AMPICILLIN/SULBACTAM: SIGNIFICANT CHANGE UP
-  CEFAZOLIN: SIGNIFICANT CHANGE UP
-  CLINDAMYCIN: SIGNIFICANT CHANGE UP
-  ERYTHROMYCIN: SIGNIFICANT CHANGE UP
-  GENTAMICIN: SIGNIFICANT CHANGE UP
-  OXACILLIN: SIGNIFICANT CHANGE UP
-  PENICILLIN: SIGNIFICANT CHANGE UP
-  RIFAMPIN: SIGNIFICANT CHANGE UP
-  TETRACYCLINE: SIGNIFICANT CHANGE UP
-  TRIMETHOPRIM/SULFAMETHOXAZOLE: SIGNIFICANT CHANGE UP
-  VANCOMYCIN: SIGNIFICANT CHANGE UP
CULTURE RESULTS: SIGNIFICANT CHANGE UP
CULTURE RESULTS: SIGNIFICANT CHANGE UP
METHOD TYPE: SIGNIFICANT CHANGE UP
ORGANISM # SPEC MICROSCOPIC CNT: SIGNIFICANT CHANGE UP
SPECIMEN SOURCE: SIGNIFICANT CHANGE UP
SPECIMEN SOURCE: SIGNIFICANT CHANGE UP

## 2022-01-19 DIAGNOSIS — Q79.60 EHLERS-DANLOS SYNDROME, UNSPECIFIED: ICD-10-CM

## 2022-01-19 DIAGNOSIS — G47.33 OBSTRUCTIVE SLEEP APNEA (ADULT) (PEDIATRIC): ICD-10-CM

## 2022-01-19 DIAGNOSIS — F17.200 NICOTINE DEPENDENCE, UNSPECIFIED, UNCOMPLICATED: ICD-10-CM

## 2022-01-19 DIAGNOSIS — Z88.0 ALLERGY STATUS TO PENICILLIN: ICD-10-CM

## 2022-01-19 DIAGNOSIS — Q07.01 ARNOLD-CHIARI SYNDROME WITH SPINA BIFIDA: ICD-10-CM

## 2022-01-19 DIAGNOSIS — Z88.1 ALLERGY STATUS TO OTHER ANTIBIOTIC AGENTS STATUS: ICD-10-CM

## 2022-01-19 DIAGNOSIS — M65.9 SYNOVITIS AND TENOSYNOVITIS, UNSPECIFIED: ICD-10-CM

## 2022-02-07 ENCOUNTER — APPOINTMENT (OUTPATIENT)
Dept: NEUROLOGY | Facility: CLINIC | Age: 57
End: 2022-02-07
Payer: COMMERCIAL

## 2022-02-07 VITALS
WEIGHT: 145 LBS | TEMPERATURE: 97.8 F | DIASTOLIC BLOOD PRESSURE: 72 MMHG | HEART RATE: 98 BPM | HEIGHT: 66 IN | SYSTOLIC BLOOD PRESSURE: 106 MMHG | BODY MASS INDEX: 23.3 KG/M2

## 2022-02-07 DIAGNOSIS — G44.229 CHRONIC TENSION-TYPE HEADACHE, NOT INTRACTABLE: ICD-10-CM

## 2022-02-07 DIAGNOSIS — R42 DIZZINESS AND GIDDINESS: ICD-10-CM

## 2022-02-07 DIAGNOSIS — G89.29 HEADACHE, UNSPECIFIED: ICD-10-CM

## 2022-02-07 DIAGNOSIS — R51.9 HEADACHE, UNSPECIFIED: ICD-10-CM

## 2022-02-07 DIAGNOSIS — G93.5 COMPRESSION OF BRAIN: ICD-10-CM

## 2022-02-07 PROCEDURE — 99214 OFFICE O/P EST MOD 30 MIN: CPT

## 2022-02-07 RX ORDER — RIZATRIPTAN BENZOATE 10 MG/1
10 TABLET ORAL
Qty: 12 | Refills: 5 | Status: ACTIVE | COMMUNITY
Start: 2021-03-09 | End: 1900-01-01

## 2022-02-07 NOTE — DISCUSSION/SUMMARY
[FreeTextEntry1] : She is 56-year-old patient with chronic migraine headaches mixed with tension headaches Chiari malformation status post surgery. \par Recent infection after Cat bite being followed by hand surgeon treated with antibiotics.\par  Stopped Using Topamax.\par Continue maxalt 10 mg prn as needed.\par Patient education provided and discussed with patient.\par Followup with you in medical problems.\par Return for  reevaluation in 6 months or as needed.\par

## 2022-02-07 NOTE — REVIEW OF SYSTEMS
[Numbness] : numbness [Tingling] : tingling [Hypersensitivity] : hypersensitivity [Dizziness] : dizziness [Migraine Headache] : migraine headaches [Tension Headache] : tension-type headaches [Arthralgias] : arthralgias [Joint Pain] : joint pain [Negative] : Heme/Lymph [de-identified] : Rt Hand index finger infection healed well.

## 2022-02-07 NOTE — HISTORY OF PRESENT ILLNESS
[FreeTextEntry1] : She is 56-year-old patient with a history of chronic migraine headaches mixed with tension headaches status post Chiari malformation surgery coming here for followup evaluation.\par Taking Topamax since last visit a recent related to [employer] in January with infection involving right index finger with no synovitis  after  cat bite treated with IV antibiotics after debridement and discharged on oral antibiotics .\par Currently not taking any other medications except Maxalt p.r.n. as rescue medication for headaches.\par No other focal symptoms. Hand is healing well. Intermittent vertigo still bothering.

## 2022-02-07 NOTE — DATA REVIEWED
[de-identified] : MRI  Brain RT frontal marie hole with gliosis/hemosiderin along the catheter \par low lying Rt cerebellar tonsil measuring 5 mm below level of foramen magnum unchanged compared to prior imaging\par MRI c  spine No acute pathology no intrinsic  cord abnormality like syrinx

## 2022-02-07 NOTE — PHYSICAL EXAM
[General Appearance - Alert] : alert [General Appearance - In No Acute Distress] : in no acute distress [Oriented To Time, Place, And Person] : oriented to person, place, and time [Impaired Insight] : insight and judgment were intact [Affect] : the affect was normal [Person] : oriented to person [Place] : oriented to place [Time] : oriented to time [Concentration Intact] : normal concentrating ability [Visual Intact] : visual attention was ~T not ~L decreased [Naming Objects] : no difficulty naming common objects [Repeating Phrases] : no difficulty repeating a phrase [Writing A Sentence] : no difficulty writing a sentence [Fluency] : fluency intact [Comprehension] : comprehension intact [Reading] : reading intact [Past History] : adequate knowledge of personal past history [Cranial Nerves Optic (II)] : visual acuity intact bilaterally,  visual fields full to confrontation, pupils equal round and reactive to light [Cranial Nerves Oculomotor (III)] : extraocular motion intact [Cranial Nerves Trigeminal (V)] : facial sensation intact symmetrically [Cranial Nerves Facial (VII)] : face symmetrical [Cranial Nerves Vestibulocochlear (VIII)] : hearing was intact bilaterally [Cranial Nerves Glossopharyngeal (IX)] : tongue and palate midline [Cranial Nerves Accessory (XI - Cranial And Spinal)] : head turning and shoulder shrug symmetric [Cranial Nerves Hypoglossal (XII)] : there was no tongue deviation with protrusion [Motor Strength] : muscle strength was normal in all four extremities [No Muscle Atrophy] : normal bulk in all four extremities [Sensation Tactile Decrease] : light touch was intact [Balance] : balance was intact [Limited Balance] : balance was intact [Past-pointing] : there was no past-pointing [Tremor] : no tremor present [2+] : Ankle jerk left 2+ [Plantar Reflex Right Only] : normal on the right [Plantar Reflex Left Only] : normal on the left [Extraocular Movements] : extraocular movements were intact [Outer Ear] : the ears and nose were normal in appearance [Oropharynx] : the oropharynx was normal [Neck Appearance] : the appearance of the neck was normal [Neck Cervical Mass (___cm)] : no neck mass was observed [Jugular Venous Distention Increased] : there was no jugular-venous distention [Thyroid Diffuse Enlargement] : the thyroid was not enlarged [Thyroid Nodule] : there were no palpable thyroid nodules [Auscultation Breath Sounds / Voice Sounds] : lungs were clear to auscultation bilaterally [Heart Rate And Rhythm] : heart rate was normal and rhythm regular [Heart Sounds] : normal S1 and S2 [Heart Sounds Gallop] : no gallops [Murmurs] : no murmurs [Heart Sounds Pericardial Friction Rub] : no pericardial rub [Full Pulse] : the pedal pulses are present [Edema] : there was no peripheral edema [Bowel Sounds] : normal bowel sounds [Abdomen Soft] : soft [Abdomen Tenderness] : non-tender [Abdomen Mass (___ Cm)] : no abdominal mass palpated [Abnormal Walk] : normal gait [Nail Clubbing] : no clubbing  or cyanosis of the fingernails [Musculoskeletal - Swelling] : no joint swelling seen [Motor Tone] : muscle strength and tone were normal [FreeTextEntry1] : Lax joints Ehler- danlos syndrome [Skin Color & Pigmentation] : normal skin color and pigmentation [Skin Turgor] : normal skin turgor [] : no rash

## 2022-02-07 NOTE — REASON FOR VISIT
[Follow-Up: _____] : a [unfilled] follow-up visit [FreeTextEntry1] : Follow up for Pt with Ch Headaches with Chiari malformation and Recent Finger Infection

## 2022-05-09 NOTE — PHYSICAL THERAPY INITIAL EVALUATION ADULT - WEIGHT-BEARING RESTRICTIONS: GAIT, REHAB EVAL
TV ULTRASOUND  THE UTERUS IS ANTEVERTED, NORMAL IN SIZE, AND ECHOGENICITY. THE ENDOMETRIUM MEASURES 15.82 MM IN THICKNESS. NO MASSES OR ABNORMALITIES ARE SEEN. THE IUD APPEARS TO BE CORRECTLY PLACED IN THE FUNDAL PORTION OF THE ENDOMETRIAL LINING. RIGHT OVARY APPEARS WNL. LEFT OVARY APPEARS WNL. NO FREE FLUID IS SEEN IN THE CDS. I    Luis here for an IUD string check. She had the mirena inserted ~ 2 weeks ago. She reports that she has been very happy with the method, without any issues. She denies any abnormal vaginal discharge or odor, and states she has no pelvic pain, dyspareunia, fever or chills. Reports normal voiding and defecatory function. .    Exam:  Blood pressure 112/60, height 5' (1.524 m), weight 131 lb (59.4 kg), currently breastfeeding. Abdomen: soft, nontender  External genitalia:  No lesions or discharge, normal hair distribution  Vagina:  Scant red discharge, no foul odor, normal rugae  Cervix:  No lesions, no discharge. IUD strings not visualized  Negative CMT  Uterus:  normal size, shape, and contour  Adnexae:  No masses or tenderness bilaterally    Assessment:  IUD check with mirena IUD in place    Plan:  US confirmed IUD placement. Patient instructed thoroughly in when and how to check her own strings. Discussed symptoms that require provider attention. She is asked to call if any fever, pain, signs of ectopic or other pregnancy, or any suspicion that there has been an expulsion of the device. Follow up for her annual exam or sooner as needed. full weight-bearing

## 2022-06-14 NOTE — H&P PST ADULT - PRO TOBACCO CESSATION INFO YN
[FreeTextEntry1] : Recommend supportive care with warm compresses and application of antibiotic eye drops if prescribed. Return if symptoms worsen.\par 
yes

## 2022-11-07 NOTE — ED ADULT TRIAGE NOTE - MODE OF ARRIVAL
Ambulance [facility] Melolabial Interpolation Flap Text: A decision was made to reconstruct the defect utilizing an interpolation axial flap and a staged reconstruction.  A telfa template was made of the defect.  This telfa template was then used to outline the melolabial interpolation flap.  The donor area for the pedicle flap was then injected with anesthesia.  The flap was excised through the skin and subcutaneous tissue down to the layer of the underlying musculature.  The pedicle flap was carefully excised within this deep plane to maintain its blood supply.  The edges of the donor site were undermined.   The donor site was closed in a primary fashion.  The pedicle was then rotated into position and sutured.  Once the tube was sutured into place, adequate blood supply was confirmed with blanching and refill.  The pedicle was then wrapped with xeroform gauze and dressed appropriately with a telfa and gauze bandage to ensure continued blood supply and protect the attached pedicle.

## 2022-11-15 NOTE — ED ADULT TRIAGE NOTE - HEIGHT IN FEET
11/15/22 8:05 AM    Called [facility] and spoke with Neil Mcfarlane; notified order for 2-wheeled walker. Patient discharging home today. 5

## 2022-12-30 NOTE — REASON FOR VISIT
unable to assess [Spouse] : spouse [Follow-Up: _____] : a [unfilled] follow-up visit [FreeTextEntry1] : 8 month follow up from Suboccipital craniectomy and C 1 lami with recurrent HA

## 2023-05-11 NOTE — PRE-OP CHECKLIST - DNR CLARIFICATION FORM COMPLETED
From: Jamal Richards  To: Dr. Conner Nation: 5/11/2023 9:57 AM EDT  Subject: Question re Dung Cr,  I have been on the Wellbutrin now for about a week and a half. I have been incredibly tearful for the past few days, to the point where I am almost embarrassed to go out in public. Wondering whether it is a side effect of the Wellbutrin or that the Wellbutrin hasn't kicked in yet from weaning from the Zoloft. If it is a side effect, I would like to try something else as this is not working for me. Please let me know if I should stop taking the Wellbutrin or stick with it.   Thank you,  Santhosh Arenas n/a

## 2024-02-06 NOTE — H&P PST ADULT - PROBLEM SELECTOR PLAN 3
2/6/2024    Yaz Cuenca  [address] [place] [phone]      Dear Yaz,    There’s no question about it - preventive care can save lives. Many health problems start out silently without symptoms. Preventive care is often the only way to catch these problems in early stages, when they can be more successfully treated.     Our records show that you are due for the screening(s) listed below. If you have completed these screening(s), please call us so we can update your record.    Screening Mammogram  Mammogram: A mammogram can find cancer long before a lump can be felt on the breast. To schedule your Mammogram, please call [phone].    Your health is important to us. Please feel free to call my office at [phone] or make an appointment to discuss the best screening options for you.     Sincerely,      Dread Farfan PA-C   [facility] [place]  [address] [address] [place] [phone]  Dept: [phone]  Dept Fax: [phone]         [facility] offers online access to your health information via [email or web address]        The Caprini score indicates this patient is at risk for a VTE event (score 3-5).  Most surgical patients in this group would benefit from pharmacologic prophylaxis.  The surgical team will determine the balance between VTE risk and bleeding risk

## 2024-05-14 NOTE — PATIENT PROFILE ADULT - SAFE PLACE TO LIVE
How Severe Are Your Spot(S)?: mild
Have Your Spot(S) Been Treated In The Past?: has not been treated
Hpi Title: Evaluation of Skin Lesions
Family Member: Grandfather
no

## 2024-12-24 PROBLEM — F10.90 ALCOHOL USE: Status: ACTIVE | Noted: 2017-11-01

## 2025-01-28 ENCOUNTER — NON-APPOINTMENT (OUTPATIENT)
Age: 60
End: 2025-01-28

## 2025-01-28 NOTE — ED STATDOCS - CROS ED MUSC ALL NEG
[facility] OB[facility]YNECOLOG[facility]  [address]   [address]  [facility] [address]  Dept: [phone]  Annual Exam    Patient Name: Brianda David  Patient : 1995  MRN #: [id number]  CSN #: [id number]    Date: 2025             Primary Care Physician: Jessika Ernst, APRN - CNP     HPI : Brianda David is a 29 y.o. female  here for an annual exam.    She reports her menses are irregular. The flow is light and they not painful.   She was on OCPs for 8 hrs for PCOS. Stopped OCPS 25. Has had 1 period off OCPs.   She is sexually active and uses nothing for contraception. She is happy with this method. Open to conception. She does admit to pain with intercourse. Reports she requires stimulation w/ vibrator to avoid pain w/ sex.      She has sex with males. She has had 1 sexual partners in the last 12 months. She does not uses condoms.    She admits to to bladder issues. Dribbling w/ full bladder.  She reports since stopping OCPs her acne has gotten worse.     Chaperone for Intimate Exam  Chaperone was offered as part of the rooming process. Patient declined and agrees to continue with exam without a chaperone.      Preventive Health Screening:   Date of last pap:   Cytology Report   Date Value Ref Range Status   2024       Path Number: [id number]    DIAGNOSIS    Imaged ThinPrep Pap - Cervical (1 monolayer slide):  Specimen Adequacy:       Satisfactory for evaluation.       -Endocervical/transformation zone component is absent.  Descriptive Diagnosis:       Negative for intraepithelial lesion or malignancy.          Cytotech Screener:  EY     **Electronically Signed Out**  MANPREET MAN(ASCP)  ey/2024      Source of Specimen:  A: Imaged ThinPrep Pap - Cervical (1 monolayer slide)    HPV Reflex?......................HPV if ASCUS      Clinical History  Z01.419 Routine gyn exam without abnormal  - - -

## 2025-02-02 ENCOUNTER — NON-APPOINTMENT (OUTPATIENT)
Age: 60
End: 2025-02-02

## 2025-02-03 ENCOUNTER — OUTPATIENT (OUTPATIENT)
Dept: OUTPATIENT SERVICES | Facility: HOSPITAL | Age: 60
LOS: 1 days | End: 2025-02-03
Payer: COMMERCIAL

## 2025-02-03 ENCOUNTER — APPOINTMENT (OUTPATIENT)
Dept: RADIOLOGY | Facility: CLINIC | Age: 60
End: 2025-02-03
Payer: COMMERCIAL

## 2025-02-03 DIAGNOSIS — Z98.890 OTHER SPECIFIED POSTPROCEDURAL STATES: Chronic | ICD-10-CM

## 2025-02-03 DIAGNOSIS — L72.3 SEBACEOUS CYST: Chronic | ICD-10-CM

## 2025-02-03 DIAGNOSIS — Z90.89 ACQUIRED ABSENCE OF OTHER ORGANS: Chronic | ICD-10-CM

## 2025-02-03 DIAGNOSIS — Z90.49 ACQUIRED ABSENCE OF OTHER SPECIFIED PARTS OF DIGESTIVE TRACT: Chronic | ICD-10-CM

## 2025-02-03 DIAGNOSIS — J45.909 UNSPECIFIED ASTHMA, UNCOMPLICATED: ICD-10-CM

## 2025-02-03 PROCEDURE — 71046 X-RAY EXAM CHEST 2 VIEWS: CPT | Mod: 26

## 2025-02-03 PROCEDURE — 71046 X-RAY EXAM CHEST 2 VIEWS: CPT

## 2025-03-24 NOTE — PHYSICAL THERAPY INITIAL EVALUATION ADULT - STANDING BALANCE: DYNAMIC, REHAB EVAL
Medication: lisinopril-hydrochlorothiazide passed protocol.   Last office visit date: 11/12/2024  Next appointment scheduled?: Yes   Number of refills given: 1    normal balance